# Patient Record
Sex: FEMALE | Race: WHITE | NOT HISPANIC OR LATINO | Employment: OTHER | URBAN - NONMETROPOLITAN AREA
[De-identification: names, ages, dates, MRNs, and addresses within clinical notes are randomized per-mention and may not be internally consistent; named-entity substitution may affect disease eponyms.]

---

## 2018-09-25 ENCOUNTER — OFFICE VISIT (OUTPATIENT)
Dept: CARDIOLOGY | Facility: CLINIC | Age: 83
End: 2018-09-25

## 2018-09-25 VITALS
WEIGHT: 133.2 LBS | HEART RATE: 63 BPM | BODY MASS INDEX: 24.51 KG/M2 | DIASTOLIC BLOOD PRESSURE: 79 MMHG | HEIGHT: 62 IN | OXYGEN SATURATION: 100 % | SYSTOLIC BLOOD PRESSURE: 152 MMHG

## 2018-09-25 DIAGNOSIS — R22.1 PULSATILE NECK MASS: ICD-10-CM

## 2018-09-25 DIAGNOSIS — R00.2 PALPITATIONS: ICD-10-CM

## 2018-09-25 DIAGNOSIS — M54.2 NECK PAIN ON RIGHT SIDE: ICD-10-CM

## 2018-09-25 DIAGNOSIS — R09.89 BRUIT OF RIGHT CAROTID ARTERY: ICD-10-CM

## 2018-09-25 DIAGNOSIS — R07.9 CHEST PAIN, UNSPECIFIED TYPE: Primary | ICD-10-CM

## 2018-09-25 DIAGNOSIS — I10 ESSENTIAL HYPERTENSION: ICD-10-CM

## 2018-09-25 DIAGNOSIS — R06.02 SHORTNESS OF BREATH: ICD-10-CM

## 2018-09-25 PROCEDURE — 99213 OFFICE O/P EST LOW 20 MIN: CPT | Performed by: NURSE PRACTITIONER

## 2018-09-25 PROCEDURE — 93000 ELECTROCARDIOGRAM COMPLETE: CPT | Performed by: NURSE PRACTITIONER

## 2018-09-25 RX ORDER — LISINOPRIL 5 MG/1
5 TABLET ORAL DAILY
COMMUNITY
Start: 2018-09-24 | End: 2018-12-04

## 2018-09-25 RX ORDER — NITROGLYCERIN 0.4 MG/1
0.4 TABLET SUBLINGUAL
COMMUNITY
Start: 2018-09-24 | End: 2022-08-18 | Stop reason: SDUPTHER

## 2018-09-25 NOTE — PROGRESS NOTES
Subjective   Radha Jung is a 82 y.o. female     Chief Complaint   Patient presents with   • Establish Care     Presents to Establish Care.    • Chest Pain   • Abnormal ECG   • Shortness of Breath       HPI    PROBLEM LIST:     1. Extrasystoles   1.1 Holter monitor 12/14/16-PVCs, PACs, 7 short runs of SVT  2. Shortness of breath   3. Fatigue   4. Myalgia      Patient is an 82-year-old female who presents today for a follow-up with her  at her side.  She states that she has been having left anterior under her breasts what feels like a dull throb.  She says it usually occurs at night.  She says and then she'll start to her all over.  She denies any nausea, lightheadedness or diaphoresis.  Will get a little short of breath.  She says this has occurred since her having left breast cancer and a lumpectomy.  She says that she feels a pulsation in her right neck and that is very sore.  When I palpate this area there is a movable mass that is there and it feels like it is her carotid artery with a possible aneurysm.  She denies any dizziness, presyncope, syncope, orthopnea, PND or edema.  She says she gets short of breath with more than normal activity.  She also complains of some numbness and tingling in her right hand and can only feel her right pinky and ring finger.  She does have a history of neck pain.    Current Outpatient Prescriptions   Medication Sig Dispense Refill   • GuaiFENesin (MUCUS RELIEF ADULT PO) Take 1 tablet by mouth Daily.     • lisinopril (PRINIVIL,ZESTRIL) 5 MG tablet Take 5 mg by mouth Daily. *Has not started yet*     • nitroglycerin (NITROSTAT) 0.4 MG SL tablet Place 0.4 mg under the tongue Every 5 (Five) Minutes As Needed for Chest Pain.     • aspirin 81 MG tablet Take 1 tablet by mouth Daily. 30 tablet 11     No current facility-administered medications for this visit.        ALLERGIES    Patient has no known allergies.    Past Medical History:   Diagnosis Date   • Asthma    • Colon  cancer (CMS/HCC)    • Hyperlipidemia        Social History     Social History   • Marital status:      Spouse name: N/A   • Number of children: N/A   • Years of education: N/A     Occupational History   • Not on file.     Social History Main Topics   • Smoking status: Former Smoker     Types: Cigarettes   • Smokeless tobacco: Never Used   • Alcohol use Yes      Comment: Social   • Drug use: No   • Sexual activity: Defer     Other Topics Concern   • Not on file     Social History Narrative   • No narrative on file       Family History   Problem Relation Age of Onset   • No Known Problems Mother    • Asthma Father        Review of Systems   Constitutional: Negative for chills, diaphoresis, fatigue and fever.   HENT: Positive for rhinorrhea. Negative for congestion, sneezing and sore throat.    Eyes: Positive for visual disturbance (Wears reading glasses).   Respiratory: Positive for shortness of breath (with activity; with more than normal activity). Negative for chest tightness.    Cardiovascular: Positive for chest pain (Patient is unsure of onset, however she states it is more noticeable at night. pain is located under left breast.  ) and palpitations (pulsation in her right neck  ). Negative for leg swelling.   Gastrointestinal: Positive for diarrhea. Negative for abdominal pain, blood in stool, constipation, nausea and vomiting.   Endocrine: Negative for cold intolerance and heat intolerance.   Genitourinary: Negative for difficulty urinating and hematuria.   Musculoskeletal: Positive for arthralgias, back pain, myalgias (To BLE at night) and neck pain (pain).   Skin: Negative.    Allergic/Immunologic: Positive for environmental allergies (Seasonal).   Neurological: Positive for numbness (right arm numb, can only feel pinky and ring finger ). Negative for dizziness, syncope, weakness, light-headedness and headaches.   Hematological: Bruises/bleeds easily (Bruise).   Psychiatric/Behavioral: Positive for  "sleep disturbance (Reports waking at night, however unsure what wakes her).       Objective   /79 (BP Location: Left arm, Patient Position: Sitting)   Pulse 63   Ht 157.5 cm (62\")   Wt 60.4 kg (133 lb 3.2 oz)   SpO2 100%   BMI 24.36 kg/m²   Vitals:    09/25/18 1312   BP: 152/79   BP Location: Left arm   Patient Position: Sitting   Pulse: 63   SpO2: 100%   Weight: 60.4 kg (133 lb 3.2 oz)   Height: 157.5 cm (62\")      Lab Results (most recent)     None        Physical Exam   Constitutional: She is oriented to person, place, and time. Vital signs are normal. She appears well-developed and well-nourished. She is active and cooperative.   HENT:   Head: Normocephalic.   Eyes: Lids are normal.   Neck: Normal carotid pulses, no hepatojugular reflux and no JVD present. Carotid bruit is present (soft, right ).   Right neck pulsatile mass, possible carotid aneurysm    Cardiovascular: Normal rate, regular rhythm and normal heart sounds.    Pulses:       Radial pulses are 2+ on the right side, and 2+ on the left side.        Dorsalis pedis pulses are 2+ on the right side, and 2+ on the left side.        Posterior tibial pulses are 2+ on the right side, and 2+ on the left side.   No edema BLE.   Pulmonary/Chest: Effort normal and breath sounds normal.   Abdominal: Normal appearance and bowel sounds are normal.   Neurological: She is alert and oriented to person, place, and time.   Skin: Skin is warm, dry and intact.   Psychiatric: She has a normal mood and affect. Her speech is normal and behavior is normal. Judgment and thought content normal. Cognition and memory are normal.       Procedure     ECG 12 Lead  Date/Time: 9/25/2018 5:40 PM  Performed by: ANNALISA TAVERA  Authorized by: ANNALISA TAVERA   Comparison: compared with previous ECG from 8/26/2016  Rhythm: sinus rhythm  Rhythm comments: sinus arrhythmia   Rate: normal  BPM: 72  T wave flattening noted on lead: nonspecific changes   QRS axis: normal  Clinical " impression: non-specific ECG                 Assessment/Plan      Diagnosis Plan   1. Chest pain, unspecified type  Stress Test With Myocardial Perfusion One Day    Adult Transthoracic Echo Complete W/ Cont if Necessary Per Protocol    aspirin 81 MG tablet   2. Palpitations  Stress Test With Myocardial Perfusion One Day    Adult Transthoracic Echo Complete W/ Cont if Necessary Per Protocol   3. Shortness of breath  Stress Test With Myocardial Perfusion One Day    Adult Transthoracic Echo Complete W/ Cont if Necessary Per Protocol   4. Essential hypertension  Stress Test With Myocardial Perfusion One Day    Adult Transthoracic Echo Complete W/ Cont if Necessary Per Protocol   5. Neck pain on right side  US Carotid Bilateral   6. Pulsatile neck mass  US Carotid Bilateral   7. Bruit of right carotid artery  US Carotid Bilateral       Return in about 8 weeks (around 11/20/2018).    Chest pain/palpitations/shortness of breath/hypertension-patient will have ischemia workup, stress and echo.  She will start aspirin 81 mg.  She will use nitroglycerin when necessary for chest pain no resolution she will go to the ER.  Right neck pain, pulsatile neck mass, right carotid bruit-patient have a lateral carotid artery ultrasound.  She will continue her medication regimen.  She'll follow-up in 8 weeks or sooner if any changes.       Patient's Body mass index is 24.36 kg/m². BMI is within normal parameters. No follow-up required.      Electronically signed by:

## 2018-09-25 NOTE — PATIENT INSTRUCTIONS
Nonspecific Chest Pain  Chest pain can be caused by many different conditions. There is a chance that your pain could be related to something serious, such as a heart attack or a blood clot in your lungs. Chest pain can also be caused by conditions that are not life-threatening. If you have chest pain, it is very important to follow up with your doctor.  Follow these instructions at home:  Medicines  · If you were prescribed an antibiotic medicine, take it as told by your doctor. Do not stop taking the antibiotic even if you start to feel better.  · Take over-the-counter and prescription medicines only as told by your doctor.  Lifestyle  · Do not use any products that contain nicotine or tobacco, such as cigarettes and e-cigarettes. If you need help quitting, ask your doctor.  · Do not drink alcohol.  · Make lifestyle changes as told by your doctor. These may include:  ? Getting regular exercise. Ask your doctor for some activities that are safe for you.  ? Eating a heart-healthy diet. A diet specialist (dietitian) can help you to learn healthy eating options.  ? Staying at a healthy weight.  ? Managing diabetes, if needed.  ? Lowering your stress, as with deep breathing or spending time in nature.  General instructions  · Avoid any activities that make you feel chest pain.  · If your chest pain is because of heartburn:  ? Raise (elevate) the head of your bed about 6 inches (15 cm). You can do this by putting blocks under the bed legs at the head of the bed.  ? Do not sleep with extra pillows under your head. That does not help heartburn.  · Keep all follow-up visits as told by your doctor. This is important. This includes any further testing if your chest pain does not go away.  Contact a doctor if:  · Your chest pain does not go away.  · You have a rash with blisters on your chest.  · You have a fever.  · You have chills.  Get help right away if:  · Your chest pain is worse.  · You have a cough that gets worse, or  you cough up blood.  · You have very bad (severe) pain in your belly (abdomen).  · You are very weak.  · You pass out (faint).  · You have either of these for no clear reason:  ? Sudden chest discomfort.  ? Sudden discomfort in your arms, back, neck, or jaw.  · You have shortness of breath at any time.  · You suddenly start to sweat, or your skin gets clammy.  · You feel sick to your stomach (nauseous).  · You throw up (vomit).  · You suddenly feel light-headed or dizzy.  · Your heart starts to beat fast, or it feels like it is skipping beats.  These symptoms may be an emergency. Do not wait to see if the symptoms will go away. Get medical help right away. Call your local emergency services (911 in the [place] Do not drive yourself to the hospital.  This information is not intended to replace advice given to you by your health care provider. Make sure you discuss any questions you have with your health care provider.  Document Released: 06/05/2009 Document Revised: 09/11/2017 Document Reviewed: 09/11/2017  [employer] Interactive Patient Education © 2017 [employer]

## 2018-10-31 ENCOUNTER — HOSPITAL ENCOUNTER (OUTPATIENT)
Dept: CARDIOLOGY | Facility: HOSPITAL | Age: 83
Discharge: HOME | End: 2018-10-31

## 2018-10-31 DIAGNOSIS — M54.2 NECK PAIN ON RIGHT SIDE: ICD-10-CM

## 2018-10-31 DIAGNOSIS — R22.1 PULSATILE NECK MASS: ICD-10-CM

## 2018-10-31 DIAGNOSIS — R09.89 BRUIT OF RIGHT CAROTID ARTERY: ICD-10-CM

## 2018-10-31 PROCEDURE — 25010000002 REGADENOSON 0.4 MG/5ML SOLUTION: Performed by: INTERNAL MEDICINE

## 2018-10-31 PROCEDURE — 93306 TTE W/DOPPLER COMPLETE: CPT | Performed by: INTERNAL MEDICINE

## 2018-10-31 PROCEDURE — A9500 TC99M SESTAMIBI: HCPCS | Performed by: INTERNAL MEDICINE

## 2018-10-31 PROCEDURE — 0 TECHNETIUM SESTAMIBI: Performed by: INTERNAL MEDICINE

## 2018-10-31 PROCEDURE — 93017 CV STRESS TEST TRACING ONLY: CPT

## 2018-10-31 PROCEDURE — 78452 HT MUSCLE IMAGE SPECT MULT: CPT

## 2018-10-31 PROCEDURE — 93880 EXTRACRANIAL BILAT STUDY: CPT | Performed by: INTERNAL MEDICINE

## 2018-10-31 PROCEDURE — 78452 HT MUSCLE IMAGE SPECT MULT: CPT | Performed by: INTERNAL MEDICINE

## 2018-10-31 PROCEDURE — 93306 TTE W/DOPPLER COMPLETE: CPT

## 2018-10-31 PROCEDURE — 93880 EXTRACRANIAL BILAT STUDY: CPT

## 2018-10-31 PROCEDURE — 93018 CV STRESS TEST I&R ONLY: CPT | Performed by: INTERNAL MEDICINE

## 2018-10-31 RX ADMIN — TECHNETIUM TC 99M SESTAMIBI 1 DOSE: 1 INJECTION INTRAVENOUS at 09:10

## 2018-10-31 RX ADMIN — REGADENOSON 0.4 MG: 0.08 INJECTION, SOLUTION INTRAVENOUS at 08:30

## 2018-10-31 RX ADMIN — TECHNETIUM TC 99M SESTAMIBI 1 DOSE: 1 INJECTION INTRAVENOUS at 08:30

## 2018-11-04 LAB
BH CV ECHO MEAS - BSA(HAYCOCK): 1.6 M^2
BH CV ECHO MEAS - BSA: 1.6 M^2
BH CV ECHO MEAS - BZI_BMI: 24.3 KILOGRAMS/M^2
BH CV ECHO MEAS - BZI_METRIC_HEIGHT: 157.5 CM
BH CV ECHO MEAS - BZI_METRIC_WEIGHT: 60.3 KG
BH CV XLRA MEAS LEFT BULB EDV: -13.8 CM/SEC
BH CV XLRA MEAS LEFT BULB PSV: -81.6 CM/SEC
BH CV XLRA MEAS LEFT CCA RATIO VEL: -86.8 CM/SEC
BH CV XLRA MEAS LEFT DIST CCA EDV: -19.8 CM/SEC
BH CV XLRA MEAS LEFT DIST CCA PSV: -86.8 CM/SEC
BH CV XLRA MEAS LEFT DIST ICA EDV: -27.5 CM/SEC
BH CV XLRA MEAS LEFT DIST ICA PSV: -82.5 CM/SEC
BH CV XLRA MEAS LEFT ICA RATIO VEL: -97.1 CM/SEC
BH CV XLRA MEAS LEFT ICA/CCA RATIO: 1.1
BH CV XLRA MEAS LEFT MID ICA EDV: -49.8 CM/SEC
BH CV XLRA MEAS LEFT MID ICA PSV: -87.7 CM/SEC
BH CV XLRA MEAS LEFT PROX CCA EDV: 23.2 CM/SEC
BH CV XLRA MEAS LEFT PROX CCA PSV: 107 CM/SEC
BH CV XLRA MEAS LEFT PROX ECA EDV: -8.6 CM/SEC
BH CV XLRA MEAS LEFT PROX ECA PSV: -74.8 CM/SEC
BH CV XLRA MEAS LEFT PROX ICA EDV: -37 CM/SEC
BH CV XLRA MEAS LEFT PROX ICA PSV: -97.1 CM/SEC
BH CV XLRA MEAS LEFT VERTEBRAL A EDV: -14.6 CM/SEC
BH CV XLRA MEAS LEFT VERTEBRAL A PSV: -54.1 CM/SEC
BH CV XLRA MEAS RIGHT BULB EDV: -9.5 CM/SEC
BH CV XLRA MEAS RIGHT BULB PSV: -77.3 CM/SEC
BH CV XLRA MEAS RIGHT CCA RATIO VEL: 107 CM/SEC
BH CV XLRA MEAS RIGHT DIST CCA EDV: 21.5 CM/SEC
BH CV XLRA MEAS RIGHT DIST CCA PSV: 107 CM/SEC
BH CV XLRA MEAS RIGHT DIST ICA EDV: -26.6 CM/SEC
BH CV XLRA MEAS RIGHT DIST ICA PSV: -98.8 CM/SEC
BH CV XLRA MEAS RIGHT ICA RATIO VEL: -103 CM/SEC
BH CV XLRA MEAS RIGHT ICA/CCA RATIO: -0.96
BH CV XLRA MEAS RIGHT MID ICA EDV: -28.4 CM/SEC
BH CV XLRA MEAS RIGHT MID ICA PSV: -103 CM/SEC
BH CV XLRA MEAS RIGHT PROX CCA EDV: 22.3 CM/SEC
BH CV XLRA MEAS RIGHT PROX CCA PSV: 134 CM/SEC
BH CV XLRA MEAS RIGHT PROX ECA EDV: -7.7 CM/SEC
BH CV XLRA MEAS RIGHT PROX ECA PSV: -59.3 CM/SEC
BH CV XLRA MEAS RIGHT PROX ICA EDV: 36.1 CM/SEC
BH CV XLRA MEAS RIGHT PROX ICA PSV: 80.8 CM/SEC
BH CV XLRA MEAS RIGHT VERTEBRAL A EDV: 0 CM/SEC
BH CV XLRA MEAS RIGHT VERTEBRAL A PSV: -62.7 CM/SEC

## 2018-11-05 ENCOUNTER — TELEPHONE (OUTPATIENT)
Dept: CARDIOLOGY | Facility: CLINIC | Age: 83
End: 2018-11-05

## 2018-11-05 DIAGNOSIS — Z00.00 HEALTHCARE MAINTENANCE: Primary | ICD-10-CM

## 2018-11-05 DIAGNOSIS — R06.02 SHORTNESS OF BREATH: ICD-10-CM

## 2018-11-05 DIAGNOSIS — R00.2 PALPITATIONS: ICD-10-CM

## 2018-11-05 DIAGNOSIS — E78.5 HYPERLIPIDEMIA, UNSPECIFIED HYPERLIPIDEMIA TYPE: ICD-10-CM

## 2018-11-05 DIAGNOSIS — I49.49 EXTRASYSTOLE: ICD-10-CM

## 2018-11-05 DIAGNOSIS — M79.10 MYALGIA: ICD-10-CM

## 2018-11-05 DIAGNOSIS — R06.02 SOB (SHORTNESS OF BREATH): ICD-10-CM

## 2018-11-05 DIAGNOSIS — I77.9 CAROTID ARTERY DISEASE, UNSPECIFIED LATERALITY, UNSPECIFIED TYPE (HCC): ICD-10-CM

## 2018-11-05 RX ORDER — ATORVASTATIN CALCIUM 20 MG/1
20 TABLET, FILM COATED ORAL DAILY
Qty: 30 TABLET | Refills: 6 | Status: SHIPPED | OUTPATIENT
Start: 2018-11-05 | End: 2019-02-12

## 2018-11-05 NOTE — TELEPHONE ENCOUNTER
Informed pt of all of her results and the message from Melina. Pt is willing to try a statin and she has ASA already. Confirmed px and informed pt that I would send it in for her. Pt states that she was unaware that she had an appt, I gave her the date and time.  Explained that Melina will review everything in depth w/ pt at that appt, pt verbalized understanding.       (Put in lab orders and sent rx to px)

## 2018-11-05 NOTE — TELEPHONE ENCOUNTER
Per Echo:  Estimated EF appears to be in the range of 56 - 60%.  Some regurgitation in several valves.           Per Stress:  #1.  Diffuse uptake of the radiopharmaceutical in the lung of unknown significance.     #2.  No scintigraphic evidence of ischemia.     #3.  Preserved post stress left ventricular systolic function with an ejection fraction of 61% and no focal wall motion abnormalities.        Per US:   #1.  Both common carotid arteries are without obstruction.     #2.  The bulb and bifurcation are also widely patent bilaterally with no flow-limiting stenoses by echo Doppler sampling.     #3.  There is 16-49% stenosis in the mid right internal carotid artery, and less than or equal to 15% stenosis throughout the left internal carotid artery.     #4.  Antegrade flow was demonstrated both vertebral arteries.     Summary: Nonobstructive carotid disease bilaterally as detailed above.  Antegrade flow both vertebral arteries.

## 2018-11-05 NOTE — TELEPHONE ENCOUNTER
"----- Message from NOE Lala sent at 11/5/2018  7:45 AM EST -----  (Stress and echo) Please advise patient have her keep follow=up appt.          (US) NO further work-up at this time, however, needs ASA and statin.  Is she will to take statin?\"  If so atorvastatin 20 mg PO QHS and get lipid and CMP in 6 weeks.   "

## 2018-11-27 ENCOUNTER — OFFICE VISIT (OUTPATIENT)
Dept: CARDIOLOGY | Facility: CLINIC | Age: 83
End: 2018-11-27

## 2018-11-27 VITALS
HEIGHT: 62 IN | SYSTOLIC BLOOD PRESSURE: 164 MMHG | HEART RATE: 77 BPM | WEIGHT: 136.4 LBS | OXYGEN SATURATION: 95 % | DIASTOLIC BLOOD PRESSURE: 81 MMHG | BODY MASS INDEX: 25.1 KG/M2

## 2018-11-27 DIAGNOSIS — I65.23 BILATERAL CAROTID ARTERY STENOSIS: ICD-10-CM

## 2018-11-27 DIAGNOSIS — I51.89 DIASTOLIC DYSFUNCTION: ICD-10-CM

## 2018-11-27 DIAGNOSIS — R07.89 OTHER CHEST PAIN: ICD-10-CM

## 2018-11-27 DIAGNOSIS — R06.02 SHORTNESS OF BREATH: ICD-10-CM

## 2018-11-27 DIAGNOSIS — I49.49 EXTRASYSTOLE: ICD-10-CM

## 2018-11-27 DIAGNOSIS — R00.2 PALPITATIONS: ICD-10-CM

## 2018-11-27 DIAGNOSIS — E78.5 HYPERLIPIDEMIA, UNSPECIFIED HYPERLIPIDEMIA TYPE: ICD-10-CM

## 2018-11-27 DIAGNOSIS — I10 ESSENTIAL HYPERTENSION: Primary | ICD-10-CM

## 2018-11-27 PROCEDURE — 99213 OFFICE O/P EST LOW 20 MIN: CPT | Performed by: NURSE PRACTITIONER

## 2018-11-27 RX ORDER — FUROSEMIDE 20 MG/1
20 TABLET ORAL DAILY PRN
Qty: 30 TABLET | Refills: 11 | Status: SHIPPED | OUTPATIENT
Start: 2018-11-27

## 2018-11-27 NOTE — PATIENT INSTRUCTIONS

## 2018-11-27 NOTE — PROGRESS NOTES
"Subjective   Radha Jung is a 83 y.o. female     Chief Complaint   Patient presents with   • Follow-up     Pt in office to follow up on echo, stress, and carotid        HPI    PROBLEM LIST:     1. Extrasystoles   1.1 Holter monitor 12/14/16-PVCs, PACs, 7 short runs of SVT  2. Shortness of breath   2.1 Echo 10/31/18-EF 56-60%, diastolic dysfunction 2, moderate LVH, trace AR, mild to moderate MR, mild TR, mild SC  3. Fatigue   4. Myalgia   5.  Stress test 10/31/18-no evidence of ischemia, preserved posterior LVEF of 61%  6.  Carotid artery disease  6.1 carotid artery ultrasound 10/31/18-both common carotid arteries are without obstruction, bulb and bifurcation are widely patent, 16-49% stenosis in the mid right internal carotid artery, less than or equal to 15% stenosis throughout the left internal carotid artery, antegrade flow demonstrated both vertebral arteries    Patient is an 83-year-old female who presents today for follow-up on testing with her  at her side.  She says that she does have occasional left-sided chest pain that is dull and proper.  She says she is not sure if it is her \"boob\" due to the fact that that is the side that she had a lumpectomy due to breast cancer.  She has no other symptoms when this occurs.  She denies any palpitations, fluttering, dizziness, presyncope, syncope, orthopnea, PND or edema.  She states that she gets short of breath sometimes when she's not doing anything and sometimes when she is active and sometime she is not short of breath at all.  Patient thinks that her statin is causing her to have a headache.  I'm thinking it more likely due to her blood pressure however nonetheless we will have her hold her statin for 2 weeks to see if this improves her symptoms.    We went over stress, echo and carotid.    Current Outpatient Medications   Medication Sig Dispense Refill   • aspirin 81 MG tablet Take 1 tablet by mouth Daily. 30 tablet 11   • atorvastatin (LIPITOR) 20 MG " tablet Take 1 tablet by mouth Daily. 30 tablet 6   • GuaiFENesin (MUCUS RELIEF ADULT PO) Take 1 tablet by mouth Daily.     • nitroglycerin (NITROSTAT) 0.4 MG SL tablet Place 0.4 mg under the tongue Every 5 (Five) Minutes As Needed for Chest Pain.     • furosemide (LASIX) 20 MG tablet Take 1 tablet by mouth Daily As Needed (For 2 lb weight gain overnight and/or swelling in legs). 30 tablet 11   • lisinopril (PRINIVIL,ZESTRIL) 5 MG tablet Take 5 mg by mouth Daily. *Has not started yet*       No current facility-administered medications for this visit.        ALLERGIES    Patient has no known allergies.    Past Medical History:   Diagnosis Date   • Asthma    • Colon cancer (CMS/[facility])    • Hyperlipidemia        Social History     Socioeconomic History   • Marital status:      Spouse name: Not on file   • Number of children: Not on file   • Years of education: Not on file   • Highest education level: Not on file   Social Needs   • Financial resource strain: Not on file   • Food insecurity - worry: Not on file   • Food insecurity - inability: Not on file   • Transportation needs - medical: Not on file   • Transportation needs - non-medical: Not on file   Occupational History   • Not on file   Tobacco Use   • Smoking status: Former Smoker     Types: Cigarettes   • Smokeless tobacco: Never Used   Substance and Sexual Activity   • Alcohol use: Yes     Comment: Social   • Drug use: No   • Sexual activity: Defer   Other Topics Concern   • Not on file   Social History Narrative   • Not on file       Family History   Problem Relation Age of Onset   • No Known Problems Mother    • Asthma Father        Review of Systems   Constitutional: Positive for fatigue. Negative for diaphoresis.   HENT: Positive for rhinorrhea (States she always has a runny nose). Negative for congestion and sore throat.    Eyes: Positive for visual disturbance (reading glasses).   Respiratory: Positive for chest tightness (occasionally ) and shortness  "of breath (States she gets SoA when she isn't doing anything, not all of the time ).    Cardiovascular: Positive for chest pain (occasionally. Left-sided CP, dull pain; it is a throb, not sure if it is her heart or boob, due to h/o cancer surgery there ). Negative for palpitations and leg swelling.   Gastrointestinal: Positive for nausea (Pt had N/V the day before yesterday, but is better now. ). Negative for abdominal pain, blood in stool, constipation, diarrhea and vomiting.   Endocrine: Negative for cold intolerance and heat intolerance.   Genitourinary: Positive for frequency. Negative for difficulty urinating, dysuria, hematuria and urgency.   Musculoskeletal: Positive for arthralgias, back pain and neck pain.   Skin: Negative.  Negative for rash and wound.   Allergic/Immunologic: Positive for environmental allergies. Negative for food allergies.   Neurological: Positive for weakness (generalized ), numbness (hands) and headaches (Pt c/o daily headaches; since starting lipitor; across forehead, like top of head going to come off, does not last long, comes and goes ). Negative for dizziness, syncope and light-headedness.   Hematological: Bruises/bleeds easily.   Psychiatric/Behavioral: Positive for sleep disturbance (Pt states that she can't go to sleep or stay asleep).       Objective   /81   Pulse 77   Ht 157.5 cm (62\")   Wt 61.9 kg (136 lb 6.4 oz)   SpO2 95%   BMI 24.95 kg/m²   Vitals:    11/27/18 1300   BP: 164/81   Pulse: 77   SpO2: 95%   Weight: 61.9 kg (136 lb 6.4 oz)   Height: 157.5 cm (62\")      Lab Results (most recent)     None        Physical Exam   Constitutional: She is oriented to person, place, and time. Vital signs are normal. She appears well-developed and well-nourished. She is active and cooperative.   HENT:   Head: Normocephalic.   Eyes: Lids are normal.   Neck: Normal carotid pulses, no hepatojugular reflux and no JVD present. Carotid bruit is present (soft right ). "   Cardiovascular: Normal rate, regular rhythm and normal heart sounds.   Pulses:       Radial pulses are 2+ on the right side, and 2+ on the left side.        Dorsalis pedis pulses are 2+ on the right side, and 2+ on the left side.        Posterior tibial pulses are 2+ on the right side, and 2+ on the left side.   No edema BLE.   Pulmonary/Chest: Effort normal and breath sounds normal.   Abdominal: Normal appearance and bowel sounds are normal.   Neurological: She is alert and oriented to person, place, and time.   Skin: Skin is warm, dry and intact.   Psychiatric: She has a normal mood and affect. Her speech is normal and behavior is normal. Judgment and thought content normal. Cognition and memory are normal.       Procedure   Procedures         Assessment/Plan      Diagnosis Plan   1. Essential hypertension     2. Palpitations     3. Extrasystole     4. Shortness of breath     5. Hyperlipidemia, unspecified hyperlipidemia type     6. Diastolic dysfunction  furosemide (LASIX) 20 MG tablet   7. Other chest pain     8. Bilateral carotid artery stenosis         Return in about 9 weeks (around 1/29/2019).    Shortness of breath-stable.  Palpitations-resolved.  Hyperlipidemia-patient was on Lipitor but she will hold that for 2 weeks to see if this resolves her headache.  If not she will go back on it.  If it does she will call will try a different medication.  Diastolic dysfunction-patient will use Lasix as needed for 2 pound weight gain overnight and or leg swelling.  Carotid artery disease-patient is on aspirin and again would hold her Lipitor for 2 weeks to see if this improves her headaches.  Retention-patient says her blood pressure is normally very good.  She will monitor her blood pressure when she calls me in 2 weeks if it is still elevated then we will increase her lisinopril.  Continue her medication regimen otherwise.  She will follow-up in 9 weeks or sooner if any changes.  Chest pain-atypical with normal  stress test.         Patient's Body mass index is 24.95 kg/m². BMI is within normal parameters. No follow-up required.      Electronically signed by:

## 2018-12-04 ENCOUNTER — TELEPHONE (OUTPATIENT)
Dept: CARDIOLOGY | Facility: CLINIC | Age: 83
End: 2018-12-04

## 2018-12-04 RX ORDER — AMLODIPINE BESYLATE 5 MG/1
5 TABLET ORAL DAILY
Qty: 30 TABLET | Refills: 11 | Status: SHIPPED | OUTPATIENT
Start: 2018-12-04 | End: 2019-02-12

## 2018-12-04 RX ORDER — CLONIDINE HYDROCHLORIDE 0.1 MG/1
TABLET ORAL
Qty: 30 TABLET | Refills: 5 | Status: SHIPPED | OUTPATIENT
Start: 2018-12-04 | End: 2022-08-17 | Stop reason: SDDI

## 2018-12-04 NOTE — TELEPHONE ENCOUNTER
Per Melina, find out how pt's HR is and if she has any other sx?   We can send in 0.1 Clonidine. Talk w/ Melina before ending call.     States that they stopped the BP medication for 2 weeks to see if HA improved. States pt's Ha improved, but BP worsened. Unsure of HR, as they have been getting BP at [facility].     Placed them on hold to speak w/ Melina. Per Melina, send in Amlodipine 5mg daily, could take 3 days to take effect. Send in Clonidine TID PRN. Add Lisinopril as allergy. Have pt call and report back in 1 week.     Informed pt and her  of the message from Melina, they verbalized understanding.

## 2018-12-04 NOTE — TELEPHONE ENCOUNTER
----- Message from Lexus Saunders MA sent at 12/4/2018 10:44 AM EST -----  Patient's  Jc called stating that he patient has been having some blood pressure issues. On 12/2 bp was 193/112, 12/3 bp was 169/105, and 12/4 bp was 205/109. He also stating the patient had stopped lipitor 1 week ago. They would like a call back at [phone], her cell is [phone] and his cell [phone]

## 2019-02-12 ENCOUNTER — OFFICE VISIT (OUTPATIENT)
Dept: CARDIOLOGY | Facility: CLINIC | Age: 84
End: 2019-02-12

## 2019-02-12 VITALS
HEIGHT: 62 IN | BODY MASS INDEX: 25.32 KG/M2 | WEIGHT: 137.6 LBS | HEART RATE: 84 BPM | OXYGEN SATURATION: 96 % | DIASTOLIC BLOOD PRESSURE: 69 MMHG | SYSTOLIC BLOOD PRESSURE: 125 MMHG

## 2019-02-12 DIAGNOSIS — R07.9 CHEST PAIN, UNSPECIFIED TYPE: ICD-10-CM

## 2019-02-12 DIAGNOSIS — R00.2 PALPITATIONS: ICD-10-CM

## 2019-02-12 DIAGNOSIS — E78.5 HYPERLIPIDEMIA, UNSPECIFIED HYPERLIPIDEMIA TYPE: ICD-10-CM

## 2019-02-12 DIAGNOSIS — R06.02 SHORTNESS OF BREATH: ICD-10-CM

## 2019-02-12 DIAGNOSIS — J06.9 UPPER RESPIRATORY TRACT INFECTION, UNSPECIFIED TYPE: ICD-10-CM

## 2019-02-12 DIAGNOSIS — I65.21 STENOSIS OF RIGHT CAROTID ARTERY: Primary | ICD-10-CM

## 2019-02-12 PROCEDURE — 99213 OFFICE O/P EST LOW 20 MIN: CPT | Performed by: NURSE PRACTITIONER

## 2019-02-12 RX ORDER — GUAIFENESIN 600 MG/1
1200 TABLET, EXTENDED RELEASE ORAL 2 TIMES DAILY
COMMUNITY
End: 2022-08-17 | Stop reason: ALTCHOICE

## 2019-02-12 RX ORDER — AZITHROMYCIN 250 MG/1
TABLET, FILM COATED ORAL
Qty: 6 TABLET | Refills: 0 | Status: SHIPPED | OUTPATIENT
Start: 2019-02-12 | End: 2022-08-17 | Stop reason: ALTCHOICE
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
## 2019-02-12 NOTE — PATIENT INSTRUCTIONS
"Fat and Cholesterol Restricted Diet  Getting too much fat and cholesterol in your diet may cause health problems. Following this diet helps keep your fat and cholesterol at normal levels. This can keep you from getting sick.  What types of fat should I choose?  · Choose monosaturated and polyunsaturated fats. These are found in foods such as olive oil, canola oil, flaxseeds, walnuts, almonds, and seeds.  · Eat more omega-3 fats. Good choices include salmon, mackerel, sardines, tuna, flaxseed oil, and ground flaxseeds.  · Limit saturated fats. These are in animal products such as meats, butter, and cream. They can also be in plant products such as palm oil, palm kernel oil, and coconut oil.  · Avoid foods with partially hydrogenated oils in them. These contain trans fats. Examples of foods that have trans fats are stick margarine, some tub margarines, cookies, crackers, and other baked goods.  What general guidelines do I need to follow?  · Check food labels. Look for the words \"trans fat\" and \"saturated fat.\"  · When preparing a meal:  ? Fill half of your plate with vegetables and green salads.  ? Fill one fourth of your plate with whole grains. Look for the word \"whole\" as the first word in the ingredient list.  ? Fill one fourth of your plate with lean protein foods.  · Eat more foods that have fiber, like apples, carrots, beans, peas, and barley.  · Eat more home-cooked foods. Eat less at restaurants and buffets.  · Limit or avoid alcohol.  · Limit foods high in starch and sugar.  · Limit fried foods.  · Cook foods without frying them. Baking, boiling, grilling, and broiling are all great options.  · Lose weight if you are overweight. Losing even a small amount of weight can help your overall health. It can also help prevent diseases such as diabetes and heart disease.  What foods can I eat?  Grains  Whole grains, such as whole wheat or whole grain breads, crackers, cereals, and pasta. Unsweetened oatmeal, " bulgur, barley, quinoa, or brown rice. Corn or whole wheat flour tortillas.  Vegetables  Fresh or frozen vegetables (raw, steamed, roasted, or grilled). Green salads.  Fruits  All fresh, canned (in natural juice), or frozen fruits.  Meat and Other Protein Products  Ground beef (85% or leaner), grass-fed beef, or beef trimmed of fat. Skinless chicken or turkey. Ground chicken or turkey. Pork trimmed of fat. All fish and seafood. Eggs. Dried beans, peas, or lentils. Unsalted nuts or seeds. Unsalted canned or dry beans.  Dairy  Low-fat dairy products, such as skim or 1% milk, 2% or reduced-fat cheeses, low-fat ricotta or cottage cheese, or plain low-fat yogurt.  Fats and Oils  Tub margarines without trans fats. Light or reduced-fat mayonnaise and salad dressings. Avocado. Olive, canola, sesame, or safflower oils. Natural peanut or almond butter (choose ones without added sugar and oil).  The items listed above may not be a complete list of recommended foods or beverages. Contact your dietitian for more options.  What foods are not recommended?  Grains  White bread. White pasta. White rice. Cornbread. Bagels, pastries, and croissants. Crackers that contain trans fat.  Vegetables  White potatoes. Corn. Creamed or fried vegetables. Vegetables in a cheese sauce.  Fruits  Dried fruits. Canned fruit in light or heavy syrup. Fruit juice.  Meat and Other Protein Products  Fatty cuts of meat. Ribs, chicken wings, chen, sausage, bologna, salami, chitterlings, fatback, hot dogs, bratwurst, and packaged luncheon meats. Liver and organ meats.  Dairy  Whole or 2% milk, cream, half-and-half, and cream cheese. Whole milk cheeses. Whole-fat or sweetened yogurt. Full-fat cheeses. Nondairy creamers and whipped toppings. Processed cheese, cheese spreads, or cheese curds.  Sweets and Desserts  Corn syrup, sugars, honey, and molasses. Candy. Jam and jelly. Syrup. Sweetened cereals. Cookies, pies, cakes, donuts, muffins, and ice  cream.  Fats and Oils  Butter, stick margarine, lard, shortening, ghee, or chen fat. Coconut, palm kernel, or palm oils.  Beverages  Alcohol. Sweetened drinks (such as sodas, lemonade, and fruit drinks or punches).  The items listed above may not be a complete list of foods and beverages to avoid. Contact your dietitian for more information.  This information is not intended to replace advice given to you by your health care provider. Make sure you discuss any questions you have with your health care provider.  Document Released: 06/18/2013 Document Revised: 08/24/2017 Document Reviewed: 03/19/2015  [employer] Interactive Patient Education © 2018 [employer]  BMI for Adults  Body mass index (BMI) is a number that is calculated from a person's weight and height. In most adults, the number is used to find how much of an adult's weight is made up of fat. BMI is not as accurate as a direct measure of body fat.  How is BMI calculated?  BMI is calculated by dividing weight in kilograms by height in meters squared. It can also be calculated by dividing weight in pounds by height in inches squared, then multiplying the resulting number by 703. Charts are available to help you find your BMI quickly and easily without doing this calculation.  How is BMI interpreted?  Health care professionals use BMI charts to identify whether an adult is underweight, at a normal weight, or overweight based on the following guidelines:  · Underweight: BMI less than 18.5.  · Normal weight: BMI between 18.5 and 24.9.  · Overweight: BMI between 25 and 29.9.  · Obese: BMI of 30 and above.    BMI is usually interpreted the same for males and females.  Weight includes both fat and muscle, so someone with a muscular build, such as an athlete, may have a BMI that is higher than 24.9. In cases like these, BMI may not accurately depict body fat. To determine if excess body fat is the cause of a BMI of 25 or higher, further assessments may need to be  "done by a health care provider.  Why is BMI a useful tool?  BMI is used to identify a possible weight problem that may be related to a medical problem or may increase the risk for medical problems. BMI can also be used to promote changes to reach a healthy weight.  This information is not intended to replace advice given to you by your health care provider. Make sure you discuss any questions you have with your health care provider.  Document Released: 08/29/2005 Document Revised: 04/27/2017 Document Reviewed: 05/15/2015  [employer] Interactive Patient Education © 2018 [employer]  Carotid Artery Disease  The carotid arteries are the two main arteries on either side of the neck that supply blood to the brain. Carotid artery disease, also called carotid artery stenosis, is the narrowing or blockage of one or both carotid arteries. Carotid artery disease increases your risk for a stroke or a transient ischemic attack (TIA). A TIA is an episode in which a waxy, fatty substance that accumulates within the artery (plaque) blocks blood flow to the brain. A TIA is considered a \"warning stroke.\"  What are the causes?  · Buildup of plaque inside the carotid arteries (atherosclerosis) (common).  · A weakened outpouching in an artery (aneurysm).  · Inflammation of the carotid artery (arteritis).  · A fibrous growth within the carotid artery (fibromuscular dysplasia).  · Tissue death within the carotid artery due to radiation treatment (post-radiation necrosis).  · Decreased blood flow due to spasms of the carotid artery (vasospasm).  · Separation of the walls of the carotid artery (carotid dissection).  What increases the risk?  · High cholesterol (dyslipidemia).  · High blood pressure (hypertension).  · Smoking.  · Obesity.  · Diabetes.  · Family history of cardiovascular disease.  · Inactivity or lack of regular exercise.  · Being male. Men have an increased risk of developing atherosclerosis earlier in life than " women.  What are the signs or symptoms?  Carotid artery disease does not cause symptoms.  How is this diagnosed?  Diagnosis of carotid artery disease may include:  · A physical exam. Your health care provider may hear an abnormal sound (bruit) when listening to the carotid arteries.  · Specific tests that look at the blood flow in the carotid arteries. These tests include:  ? Carotid artery ultrasonography.  ? Carotid or cerebral angiography.  ? Computerized tomographic angiography (CTA).  ? Magnetic resonance angiography (MRA).    How is this treated?  Treatment of carotid artery disease can include a combination of treatments. Treatment options include:  · Surgery. You may have:  ? A carotid endarterectomy. This is a surgery to remove the blockages in the carotid arteries.  ? A carotid angioplasty with stenting. This is a nonsurgical interventional procedure. A wire mesh (stent) is used to widen the blocked carotid arteries.  · Medicines to control blood pressure, cholesterol, and reduce blood clotting (antiplatelet therapy).  · Adjusting your diet.  · Lifestyle changes such as:  ? Quitting smoking.  ? Exercising as tolerated or as directed by your health care provider.  ? Controlling and maintaining a good blood pressure.  ? Keeping cholesterol levels under control.    Follow these instructions at home:  · Take medicines only as directed by your health care provider. Make sure you understand all your medicine instructions. Do not stop your medicines without talking to your health care provider.  · Follow your health care provider's diet instructions. It is important to eat a healthy diet that is low in saturated fats and includes plenty of fresh fruits, vegetables, and lean meats. High-fat, high-sodium foods as well as foods that are fried, overly processed, or have poor nutritional value should be avoided.  · Maintain a healthy weight.  · Stay physically active. It is recommended that you get at least 30 minutes  of activity every day.  · Do not use any tobacco products including cigarettes, chewing tobacco, or electronic cigarettes. If you need help quitting, ask your health care provider.  · Limit alcohol use to:  ? No more than 2 drinks per day for men.  ? No more than 1 drink per day for nonpregnant women.  · Do not use illegal drugs.  · Keep all follow-up visits as directed by your health care provider.  Get help right away if:  You develop TIA or stroke symptoms. These include:  · Sudden weakness or numbness on one side of the body, such as in the face, arm, or leg.  · Sudden confusion.  · Trouble speaking (aphasia) or understanding.  · Sudden trouble seeing out of one or both eyes.  · Sudden trouble walking.  · Dizziness or feeling like you might faint.  · Loss of balance or coordination.  · Sudden severe headache with no known cause.  · Sudden trouble swallowing (dysphagia).    If you have any of these symptoms, call your local emergency services (911 in [place] Do not drive yourself to the clinic or hospital. This is a medical emergency.  This information is not intended to replace advice given to you by your health care provider. Make sure you discuss any questions you have with your health care provider.  Document Released: 03/11/2013 Document Revised: 05/25/2017 Document Reviewed: 06/18/2014  [employer] Interactive Patient Education © 2017 [employer]

## 2020-11-04 ENCOUNTER — OFFICE VISIT (OUTPATIENT)
Dept: CARDIOLOGY | Facility: CLINIC | Age: 85
End: 2020-11-04

## 2020-11-04 VITALS
HEIGHT: 62 IN | WEIGHT: 129 LBS | BODY MASS INDEX: 23.74 KG/M2 | SYSTOLIC BLOOD PRESSURE: 150 MMHG | DIASTOLIC BLOOD PRESSURE: 90 MMHG | HEART RATE: 72 BPM | OXYGEN SATURATION: 96 %

## 2020-11-04 DIAGNOSIS — G81.90 HEMIPARESIS, UNSPECIFIED HEMIPARESIS ETIOLOGY, UNSPECIFIED LATERALITY (HCC): ICD-10-CM

## 2020-11-04 DIAGNOSIS — G45.9 TIA (TRANSIENT ISCHEMIC ATTACK): Primary | ICD-10-CM

## 2020-11-04 DIAGNOSIS — R06.02 SHORTNESS OF BREATH: ICD-10-CM

## 2020-11-04 DIAGNOSIS — H04.032 LACRIMAL GLAND ENLARGEMENT, CHRONIC, LEFT: ICD-10-CM

## 2020-11-04 DIAGNOSIS — R93.0 ABNORMAL CT OF THE HEAD: ICD-10-CM

## 2020-11-04 DIAGNOSIS — R09.89 CAROTID BRUIT, UNSPECIFIED LATERALITY: ICD-10-CM

## 2020-11-04 DIAGNOSIS — I10 ESSENTIAL HYPERTENSION: ICD-10-CM

## 2020-11-04 DIAGNOSIS — R00.2 PALPITATIONS: ICD-10-CM

## 2020-11-04 PROCEDURE — 99214 OFFICE O/P EST MOD 30 MIN: CPT | Performed by: PHYSICIAN ASSISTANT

## 2020-11-04 RX ORDER — ASPIRIN 81 MG/1
81 TABLET ORAL DAILY
Qty: 30 TABLET | Refills: 5 | Status: SHIPPED | OUTPATIENT
Start: 2020-11-04 | End: 2022-08-18 | Stop reason: SDUPTHER

## 2020-11-04 RX ORDER — ATORVASTATIN CALCIUM 40 MG/1
40 TABLET, FILM COATED ORAL DAILY
Qty: 30 TABLET | Refills: 11 | Status: SHIPPED | OUTPATIENT
Start: 2020-11-04 | End: 2021-10-04

## 2020-11-04 RX ORDER — LISINOPRIL 10 MG/1
10 TABLET ORAL DAILY
Qty: 60 TABLET | Refills: 5 | Status: SHIPPED | OUTPATIENT
Start: 2020-11-04 | End: 2021-01-18

## 2020-11-04 NOTE — PROGRESS NOTES
Problem list     Subjective   Radha Jung is a 85 y.o. female     Chief Complaint   Patient presents with   • Follow-up   PROBLEM LIST:      1.  Palpitations  1.1 Holter monitor 12/14/16-PVCs, PACs, 7 short runs of SVT  2.  Preserved systolic function  3. Fatigue   4.  TIA  5.  Stress test 10/31/18-no evidence of ischemia, preserved posterior LVEF of 61%  6.  Carotid artery disease  6.1 nonobstructive carotid artery stenosis by ultrasound October 2018       HPI    Patient is a 85-year-old female that presents to the office after hospitalization.    Patient describes waking up on Sunday.  She felt weak on her left side.  She was not able to put all of her weight on her leg.  She did not describe any trouble speaking or visual abnormalities.   was concerned.  Patient felt poorly.  The next day, they went to see a chiropractor per their report.  He states that she complains of lower back issues and felt that this may be related to some type of neuropathic issue.  Chiropractor felt that this possibly could represent a CVA.  It was recommended she see primary.  Apparently, the primary was not in yesterday.  She did not go to the hospital.  On Monday, she did fall and went to the emergency room and had a chest x-ray to look for any evidence of rib fracture.    Patient is sitting in the exam room today feeling well.  She continues to feel weakness in her left side.  Her  strength on her left arm is weak.  She cannot use her left leg as much she had in the past.  She describes diffuse total body pain at least to my understanding.  She describes pressure in multiple places in her body including her chest and arms and legs.  She does have mild levels of exertional dyspnea.  She does not describe progressive shortness of breath.  She does not describe PND orthopnea.    She has had palpitations and they have been chronic.  As above, she had monitor in the past showing frequent premature beats as well as short burst  of SVT.  She does not describe any presyncope or syncope.  Otherwise a stable    Current Outpatient Medications on File Prior to Visit   Medication Sig Dispense Refill   • azithromycin (ZITHROMAX) 250 MG tablet Take 2 tablets the first day, then 1 tablet daily for 4 days. 6 tablet 0   • CloNIDine (CATAPRES) 0.1 MG tablet Take TID PRN for BP higher than top #160, bottom # 90 30 tablet 5   • furosemide (LASIX) 20 MG tablet Take 1 tablet by mouth Daily As Needed (For 2 lb weight gain overnight and/or swelling in legs). 30 tablet 11   • guaiFENesin (MUCINEX) 600 MG 12 hr tablet Take 1,200 mg by mouth 2 (Two) Times a Day.     • nitroglycerin (NITROSTAT) 0.4 MG SL tablet Place 0.4 mg under the tongue Every 5 (Five) Minutes As Needed for Chest Pain.     • [DISCONTINUED] aspirin 81 MG tablet Take 1 tablet by mouth Daily. 30 tablet 11     No current facility-administered medications on file prior to visit.        Patient has no known allergies.    Past Medical History:   Diagnosis Date   • Asthma    • Breast cancer (CMS/HCC)    • Colon cancer (CMS/HCC)    • Hyperlipidemia    • TIA (transient ischemic attack)        Social History     Socioeconomic History   • Marital status:      Spouse name: Not on file   • Number of children: Not on file   • Years of education: Not on file   • Highest education level: Not on file   Tobacco Use   • Smoking status: Former Smoker     Types: Cigarettes   • Smokeless tobacco: Never Used   • Tobacco comment: -EL 2/12/19    Substance and Sexual Activity   • Alcohol use: Yes     Comment: Social   • Drug use: No   • Sexual activity: Defer       Family History   Problem Relation Age of Onset   • No Known Problems Mother    • Asthma Father        Review of Systems   Respiratory: Positive for shortness of breath. Negative for chest tightness and wheezing.    Cardiovascular: Positive for chest pain and palpitations. Negative for leg swelling.   Endocrine: Negative.    Musculoskeletal: Positive  "for back pain, neck pain and neck stiffness.   Skin: Positive for rash and wound (wound on face from falling- result of TIA).   Allergic/Immunologic: Positive for environmental allergies (seasonal).   Neurological: Positive for syncope (believes due to TIA), weakness, light-headedness, numbness (right hand) and headaches. Negative for dizziness.   Hematological: Bruises/bleeds easily.   Psychiatric/Behavioral: Positive for sleep disturbance (staying asleep. maame SoCHAYITO at HS).   All other systems reviewed and are negative.      Objective   Vitals:    11/04/20 0947   BP: 150/90   Pulse: 72   SpO2: 96%   Weight: 58.5 kg (129 lb)   Height: 157.5 cm (62.01\")      /90   Pulse 72   Ht 157.5 cm (62.01\")   Wt 58.5 kg (129 lb)   SpO2 96%   BMI 23.59 kg/m²     Lab Results (most recent)     None          Physical Exam  Vitals signs and nursing note reviewed.   Constitutional:       General: She is not in acute distress.     Appearance: Normal appearance. She is well-developed.   HENT:      Head: Normocephalic and atraumatic.   Eyes:      General: No scleral icterus.        Right eye: No discharge.         Left eye: No discharge.      Conjunctiva/sclera: Conjunctivae normal.   Neck:      Vascular: No carotid bruit.   Cardiovascular:      Rate and Rhythm: Normal rate and regular rhythm.      Heart sounds: Normal heart sounds. No murmur. No friction rub. No gallop.    Pulmonary:      Effort: Pulmonary effort is normal. No respiratory distress.      Breath sounds: Normal breath sounds. No wheezing or rales.   Chest:      Chest wall: No tenderness.   Musculoskeletal:      Right lower leg: No edema.      Left lower leg: No edema.   Skin:     General: Skin is warm and dry.      Coloration: Skin is not pale.      Findings: No erythema or rash.   Neurological:      Mental Status: She is alert and oriented to person, place, and time.      Cranial Nerves: Cranial nerve deficit present.      Motor: Weakness present.      " Comments: Left-sided hemineglect with decreased  strength on the left approximately 3/5.  Unable to lift left lower extremity   Psychiatric:         Behavior: Behavior normal.         Procedure   Procedures       Assessment/Plan     Problems Addressed this Visit        Cardiovascular and Mediastinum    Palpitations    Relevant Orders    Adult Transthoracic Echo Complete W/ Cont if Necessary Per Protocol    Cardiac Event Monitor    CT Head Without Contrast    TIA (transient ischemic attack) - Primary    Relevant Orders    Adult Transthoracic Echo Complete W/ Cont if Necessary Per Protocol    Cardiac Event Monitor    CT Head Without Contrast    Duplex Carotid Ultrasound CAR       Respiratory    Shortness of breath    Relevant Orders    Adult Transthoracic Echo Complete W/ Cont if Necessary Per Protocol    Cardiac Event Monitor    CT Head Without Contrast      Other Visit Diagnoses     Essential hypertension        Relevant Medications    lisinopril (PRINIVIL,ZESTRIL) 10 MG tablet    Other Relevant Orders    Adult Transthoracic Echo Complete W/ Cont if Necessary Per Protocol    Cardiac Event Monitor    CT Head Without Contrast    Carotid bruit, unspecified laterality        Relevant Orders    Duplex Carotid Ultrasound CAR      Diagnoses       Codes Comments    TIA (transient ischemic attack)    -  Primary ICD-10-CM: G45.9  ICD-9-CM: 435.9     Palpitations     ICD-10-CM: R00.2  ICD-9-CM: 785.1     Shortness of breath     ICD-10-CM: R06.02  ICD-9-CM: 786.05     Essential hypertension     ICD-10-CM: I10  ICD-9-CM: 401.9     Carotid bruit, unspecified laterality     ICD-10-CM: R09.89  ICD-9-CM: 785.9           Recommendation  1.  Patient is a 85-year-old female that presents to the office after what appears to be a cerebrovascular accident.  She did not go to the hospital.  It has been since this weekend since symptoms have occurred.  Her residual symptoms appear to be left-sided hemineglect.  She would be outside of  the window for any type of TPA.  She does not have any dysarthria or visual abnormalities.    2.  I am trying to get a stat CT of the head to confirm what we suspect.  She will ultimately likely need an MRI.    3.  I am obtaining an echocardiogram with contrast to look for any evidence of septal defects.  We would also like to rule out mural thrombus    4.  I am scheduling 30-day event monitor to rule out any arrhythmic substrate    5.  I am starting her on aspirin and statin therapy as she was not on either prior to CVA.  Patient will be on medications to help with hypertension.  We will prescribe lisinopril.    6.  Patient had been taking Eliquis, that her  had, because of her possible stroke.  I instructed them not to do that.  At this point, we have not ruled out that this could be a hemorrhagic event    7.  Stat CT of the head to be performed today we will direct further from that time.  We will send results to primary.  We have tried to contact their office as she will need close follow-up.  She may likely need neurology referral.  I want to see her back soon possibly next week.  She is to follow with primary scheduled.  Extensive discussion given that if she has any worsening symptoms as discussed with her to suggest stroke, she is to contact emergency medical services.  They verbalized understanding and acknowledged.  We will see her back for follow-up.  She is to follow with primary as scheduled           Radha Jung  reports that she has quit smoking. Her smoking use included cigarettes. She has never used smokeless tobacco..        Patient's Body mass index is 23.59 kg/m². BMI is within normal parameters. No follow-up required..     Advance Care Planning   ACP discussion was declined by the patient. Patient has an advance directive (not in EMR), copy requested.  Electronically signed by:

## 2020-11-04 NOTE — PATIENT INSTRUCTIONS
How to Quarantine at Home  Information for Patients and Families    These instructions are for people with confirmed or suspected COVID-19 who do not need to be hospitalized and those with confirmed COVID-19 who were hospitalized and discharged to care for themselves at home.    If you were tested through the Health Department  The Health Department will monitor your wellbeing.  If it is determined that you do not need to be hospitalized and can be isolated at home, you will be monitored by staff from your local or state health department.     If you were tested through a Commercial Lab  You will need to monitor yourself and report changes in your symptoms to your doctor.  See the section below called Monitor Your Symptoms.    Follow these steps until a healthcare provider or local or state health department says you can return to your normal activities.    Stay home except to get medical care  • Restrict activities outside your home, except for getting medical care.   • Do not go to work, school, or public areas.   • Avoid using public transportation, ride-sharing, or taxis.    Separate yourself from other people and animals in your home  People  As much as possible, you should stay in a specific room and away from other people in your home. Also, you should use a separate bathroom, if available.    Animals  You should restrict contact with pets and other animals while you are sick with COVID-19, just like you would around other people. When possible, have another member of your household care for your animals while you are sick. If you are sick with COVID-19, avoid contact with your pet, including petting, snuggling, being kissed or licked, and sharing food. If you must care for your pet or be around animals while you are sick, wash your hands before and after you interact with pets and wear a facemask. See COVID-19 and Animals for more information.    Call ahead before visiting your doctor  If you have a medical  appointment, call the healthcare provider and tell them that you have or may have COVID-19. This information will help the healthcare provider’s office take steps to keep other people from getting infected or exposed.    Wear a facemask  You should wear a facemask when you are around other people (e.g., sharing a room or vehicle) or pets and before you enter a healthcare provider’s office.     If you are not able to wear a facemask (for example, because it causes trouble breathing), then people who live with you should not stay in the same room with you, or they should wear a facemask if they enter your room.    Cover your coughs and sneezes  • Cover your mouth and nose with a tissue when you cough or sneeze.   • Throw used tissues in a lined trash can.   • Immediately wash your hands with soap and water for at least 20 seconds or, if soap and water are not available, clean your hands with an alcohol-based hand  that contains at least 60% alcohol.    Clean your hands often  • Wash your hands often with soap and water for at least 20 seconds, especially after blowing your nose, coughing, or sneezing; going to the bathroom; and before eating or preparing food.     • If soap and water are not readily available, use an alcohol-based hand  with at least 60% alcohol, covering all surfaces of your hands and rubbing them together until they feel dry.    • Soap and water are the best option if hands are visibly dirty. Avoid touching your eyes, nose, and mouth with unwashed hands.    Avoid sharing personal household items  • You should not share dishes, drinking glasses, cups, eating utensils, towels, or bedding with other people or pets in your home.   • After using these items, they should be washed thoroughly with soap and water.    Clean all “high-touch” surfaces everyday  • High touch surfaces include counters, tabletops, doorknobs, bathroom fixtures, toilets, phones, keyboards, tablets, and bedside  tables.   • Also, clean any surfaces that may have blood, stool, or body fluids on them.   • Use a household cleaning spray or wipe, according to the label instructions. Labels contain instructions for safe and effective use of the cleaning product, including precautions you should take when applying the product, such as wearing gloves and making sure you have good ventilation during use of the product.    Monitor your symptoms  • Seek prompt medical attention if your illness is worsening (e.g., difficulty breathing).   • Before seeking care, call your healthcare provider and tell them that you have, or are being evaluated for, COVID-19.   • Put on a facemask before you enter the facility.     • These steps will help the healthcare provider’s office to keep other people in the office or waiting room from getting infected or exposed.   • Persons who are placed under active monitoring or facilitated self-monitoring should follow instructions provided by their local health department or occupational health professionals, as appropriate.  • If you have a medical emergency and need to call 911, notify the dispatch personnel that you have, or are being evaluated for COVID-19. If possible, put on a facemask before emergency medical services arrive.    Discontinuing home isolation  Patients with confirmed COVID-19 should remain under home isolation precautions until the risk of secondary transmission to others is thought to be low. The decision to discontinue home isolation precautions should be made on a case-by-case basis, in consultation with healthcare providers and state and local health departments.    The below content are for household members, intimate partners, and caregivers of a patient with symptomatic laboratory-confirmed COVID-19 or a patient under investigation:    Household members, intimate partners, and caregivers may have close contact with a person with symptomatic, laboratory-confirmed COVID-19 or a  person under investigation.     Close contacts should monitor their health; they should call their healthcare provider right away if they develop symptoms suggestive of COVID-19 (e.g., fever, cough, shortness of breath)     Close contacts should also follow these recommendations:  • Make sure that you understand and can help the patient follow their healthcare provider’s instructions for medication(s) and care. You should help the patient with basic needs in the home and provide support for getting groceries, prescriptions, and other personal needs.  • Monitor the patient’s symptoms. If the patient is getting sicker, call his or her healthcare provider and tell them that the patient has laboratory-confirmed COVID-19. This will help the healthcare provider’s office take steps to keep other people in the office or waiting room from getting infected. Ask the healthcare provider to call the local or [facility] health department for additional guidance. If the patient has a medical emergency and you need to call 911, notify the dispatch personnel that the patient has, or is being evaluated for COVID-19.  • Household members should stay in another room or be  from the patient as much as possible. Household members should use a separate bedroom and bathroom, if available.  • Prohibit visitors who do not have an essential need to be in the home.  • Household members should care for any pets in the home. Do not handle pets or other animals while sick.  For more information, see COVID-19 and Animals.  • Make sure that shared spaces in the home have good air flow, such as by an air conditioner or an opened window, weather permitting.  • Perform hand hygiene frequently. Wash your hands often with soap and water for at least 20 seconds or use an alcohol-based hand  that contains 60 to 95% alcohol, covering all surfaces of your hands and rubbing them together until they feel dry. Soap and water should be used  preferentially if hands are visibly dirty.  • Avoid touching your eyes, nose, and mouth with unwashed hands.  • The patient should wear a facemask when you are around other people. If the patient is not able to wear a facemask (for example, because it causes trouble breathing), you, as the caregiver, should wear a mask when you are in the same room as the patient.  • Wear a disposable facemask and gloves when you touch or have contact with the patient’s blood, stool, or body fluids, such as saliva, sputum, nasal mucus, vomit, or urine.   o Throw out disposable facemasks and gloves after using them. Do not reuse.  o When removing personal protective equipment, first remove and dispose of gloves. Then, immediately clean your hands with soap and water or alcohol-based hand . Next, remove and dispose of facemask, and immediately clean your hands again with soap and water or alcohol-based hand .  • Avoid sharing household items with the patient. You should not share dishes, drinking glasses, cups, eating utensils, towels, bedding, or other items. After the patient uses these items, you should wash them thoroughly (see below “Wash laundry thoroughly”).  • Clean all “high-touch” surfaces, such as counters, tabletops, doorknobs, bathroom fixtures, toilets, phones, keyboards, tablets, and bedside tables, every day. Also, clean any surfaces that may have blood, stool, or body fluids on them.   o Use a household cleaning spray or wipe, according to the label instructions. Labels contain instructions for safe and effective use of the cleaning product including precautions you should take when applying the product, such as wearing gloves and making sure you have good ventilation during use of the product.  • Wash laundry thoroughly.   o Immediately remove and wash clothes or bedding that have blood, stool, or body fluids on them.  o Wear disposable gloves while handling soiled items and keep soiled items away  from your body. Clean your hands (with soap and water or an alcohol-based hand ) immediately after removing your gloves.  o Read and follow directions on labels of laundry or clothing items and detergent. In general, using a normal laundry detergent according to washing machine instructions and dry thoroughly using the warmest temperatures recommended on the clothing label.  • Place all used disposable gloves, facemasks, and other contaminated items in a lined container before disposing of them with other household waste. Clean your hands (with soap and water or an alcohol-based hand ) immediately after handling these items. Soap and water should be used preferentially if hands are visibly dirty.  • Discuss any additional questions with your state or local health department or healthcare provider.    Adapted from information provided by the Centers for Disease Control and Prevention.  For more information, visit https://www.cdc.gov/coronavirus/2019-ncov/hcp/guidance-prevent-spread.html  Advance Care Planning and Advance Directives     You make decisions on a daily basis - decisions about where you want to live, your career, your home, your life. Perhaps one of the most important decisions you face is your choice for future medical care. Take time to talk with your family and your healthcare team and start planning today.  Advance Care Planning is a process that can help you:  · Understand possible future healthcare decisions in light of your own experiences  · Reflect on those decision in light of your goals and values  · Discuss your decisions with those closest to you and the healthcare professionals that care for you  · Make a plan by creating a document that reflects your wishes    Surrogate Decision Maker  In the event of a medical emergency, which has left you unable to communicate or to make your own decisions, you would need someone to make decisions for you.  It is important to discuss  your preferences for medical treatment with this person while you are in good health.     Qualities of a surrogate decision maker:  • Willing to take on this role and responsibility  • Knows what you want for future medical care  • Willing to follow your wishes even if they don't agree with them  • Able to make difficult medical decisions under stressful circumstances    Advance Directives  These are legal documents you can create that will guide your healthcare team and decision maker(s) when needed. These documents can be stored in the electronic medical record.    · Living Will - a legal document to guide your care if you have a terminal condition or a serious illness and are unable to communicate. States vary by statute in document names/types, but most forms may include one or more of the following:        -  Directions regarding life-prolonging treatments        -  Directions regarding artificially provided nutrition/hydration        -  Choosing a healthcare decision maker        -  Direction regarding organ/tissue donation    · Durable Power of  for Healthcare - this document names an -in-fact to make medical decisions for you, but it may also allow this person to make personal and financial decisions for you. Please seek the advice of an  if you need this type of document.    **Advance Directives are not required and no one may discriminate against you if you do not sign one.    Medical Orders  Many states allow specific forms/orders signed by your physician to record your wishes for medical treatment in your current state of health. This form, signed in personal communication with your physician, addresses resuscitation and other medical interventions that you may or may not want.      For more information or to schedule a time with a [facility] Advance Care Planning Facilitator contact: [email or web address] or call [phone] and someone will contact you directly.

## 2020-11-05 ENCOUNTER — TELEPHONE (OUTPATIENT)
Dept: CARDIOLOGY | Facility: CLINIC | Age: 85
End: 2020-11-05

## 2020-11-05 NOTE — TELEPHONE ENCOUNTER
I called patient to go over results of STAT head CT from yesterday. Upon answering the phone patient was obviously very short of breath. She stated she could not breathe or feel her arms or legs. I asked patient if she felt that we needed to call 911. Patient stated she did not know what to do and at this point I asked Rebecca to assist. At this time, Rebecca took over the phone call. See additional notes from her re: remainder of call.

## 2020-11-05 NOTE — TELEPHONE ENCOUNTER
"Miriam contacted pt regd her CT head results, while on the phone w/ pt, she informed Miriam that she had lost the ability to move her extremities.    Miriam asked that I assist w/ call:  I asked pt if she was alone, she stated she was as her  stepped out. I asked pt if she was able to move her extremities, she stated that she had to use both hands and drink her cereal this am. I confirmed w/ her that she was able to move her extremities, just not well. I asked if she felt an ambulance was needed. She stated \"MY  and I can call an ambulance, we're not stupid.\" I informed her that I wasn't saying that, that I just am trying to make sure she gets the assistance she needs.   I informed her that Toño is suggesting that we do a STAT MRI of her brain due to the swelling found behind her eye. Pt states she had brain imaging yesterday. I verbalized understanding and informed her that this would be a different type of imaging. Informed her that her CT head showed no evidence of stroke, which is good, but that it showed swelling behind one eye, which was concerning. Pt stated that I didn't realized how long a drive it is for her and how long they made her wait yesterday and that it was inconvenient. I verbalized understanding and apologized for that, but stated that her health is our main priority and that I unfortunately can't assist w/ the distance or wait at the hospital.   Pt stated that she has an eye infection and that's what's causing the swelling. I informed her that further testing is her choice, I'm just making her aware of what the provider thinks is best for her health. She reiterated that she \"tried to tell you all\" about her eye infection and that's why there's swelling. Pt does not wish to move forward w/ testing and ended call abruptly.   -Rebecca Osborne, RMA 9:45am 11/5/2020  "

## 2020-11-06 ENCOUNTER — TELEPHONE (OUTPATIENT)
Dept: CARDIOLOGY | Facility: CLINIC | Age: 85
End: 2020-11-06

## 2020-11-06 NOTE — TELEPHONE ENCOUNTER
Patients  called and said that they didn't get to go to the CT Head w/o contrast at the hospital because they were not notified in a timely manor.     He was extremely upset, wanted to talk to Toño himself because his wife is getting worse but I told him he doesn't work on Fridays.      He said ok and hung up.

## 2020-11-06 NOTE — TELEPHONE ENCOUNTER
I called patient yesterday to go over results of CT Head and that Toño wanted her to go to [facility] for a STAT Brain MRI. There was no appointment time, patient was advised just to go to the hospital. Patient became very agitated and refused going to the hospital. Patient did not want to speak with Rebecca regarding this any further.     Sussy spoke with patients , she clarified that patient does need to have MRI. They do not want to travel to [facility] for testing so we will attempt to find somewhere in [facility] who will perform MRI.    Patients  states that she is having to crawl everywhere. She said  attempted to call an ambulance for her at 3am and patient refused.    Sussy got in touch with [facility] in [place] who stated that patient would be added on today and could come right now. Sussy spoke with , he is aware of appointment and location and verbalized understanding.

## 2020-11-10 ENCOUNTER — TELEPHONE (OUTPATIENT)
Dept: CARDIOLOGY | Facility: CLINIC | Age: 85
End: 2020-11-10

## 2020-11-10 NOTE — TELEPHONE ENCOUNTER
Johanne @ [facility] left a VM requesting a return call regarding this pt.  VM left for Johanne.

## 2020-11-11 ENCOUNTER — HOSPITAL ENCOUNTER (OUTPATIENT)
Dept: CARDIOLOGY | Facility: HOSPITAL | Age: 85
Discharge: HOME | End: 2020-11-11

## 2020-11-11 DIAGNOSIS — G45.9 TIA (TRANSIENT ISCHEMIC ATTACK): ICD-10-CM

## 2020-11-11 DIAGNOSIS — R06.02 SHORTNESS OF BREATH: ICD-10-CM

## 2020-11-11 DIAGNOSIS — R09.89 CAROTID BRUIT, UNSPECIFIED LATERALITY: ICD-10-CM

## 2020-11-11 DIAGNOSIS — I10 ESSENTIAL HYPERTENSION: ICD-10-CM

## 2020-11-11 DIAGNOSIS — R00.2 PALPITATIONS: ICD-10-CM

## 2020-11-11 PROCEDURE — 93880 EXTRACRANIAL BILAT STUDY: CPT | Performed by: INTERNAL MEDICINE

## 2020-11-11 PROCEDURE — 93306 TTE W/DOPPLER COMPLETE: CPT | Performed by: INTERNAL MEDICINE

## 2020-11-11 PROCEDURE — 93880 EXTRACRANIAL BILAT STUDY: CPT

## 2020-11-11 PROCEDURE — 93306 TTE W/DOPPLER COMPLETE: CPT

## 2020-11-11 RX ORDER — SODIUM CHLORIDE 9 MG/ML
8 INJECTION INTRAMUSCULAR; INTRAVENOUS; SUBCUTANEOUS ONCE AS NEEDED
Status: COMPLETED | OUTPATIENT
Start: 2020-11-11 | End: 2020-11-11

## 2020-11-11 RX ADMIN — SODIUM CHLORIDE 8 ML: 9 INJECTION, SOLUTION INTRAMUSCULAR; INTRAVENOUS; SUBCUTANEOUS at 12:15

## 2020-11-13 ENCOUNTER — TELEPHONE (OUTPATIENT)
Dept: CARDIOLOGY | Facility: CLINIC | Age: 85
End: 2020-11-13

## 2020-11-13 NOTE — TELEPHONE ENCOUNTER
The pt's PCP contacted HENOK Bautista to notify him pt is having issues with the cardiac monitor.  The PM called the pt, she reports inability to wear the monitor due to sensitivity of the mastectomy site. The pt will send the monitor back and call our office if the sensitivity improves and she wants to attempt to proceed with the monitor.

## 2020-11-22 LAB
BH CV ECHO MEAS - ACS: 2 CM
BH CV ECHO MEAS - AO MAX PG: 5.6 MMHG
BH CV ECHO MEAS - AO MEAN PG: 3 MMHG
BH CV ECHO MEAS - AO ROOT AREA (BSA CORRECTED): 2
BH CV ECHO MEAS - AO ROOT AREA: 8 CM^2
BH CV ECHO MEAS - AO ROOT DIAM: 3.2 CM
BH CV ECHO MEAS - AO V2 MAX: 118 CM/SEC
BH CV ECHO MEAS - AO V2 MEAN: 84.7 CM/SEC
BH CV ECHO MEAS - AO V2 VTI: 26.2 CM
BH CV ECHO MEAS - BSA(HAYCOCK): 1.6 M^2
BH CV ECHO MEAS - BSA(HAYCOCK): 1.6 M^2
BH CV ECHO MEAS - BSA: 1.6 M^2
BH CV ECHO MEAS - BSA: 1.6 M^2
BH CV ECHO MEAS - BZI_BMI: 23.6 KILOGRAMS/M^2
BH CV ECHO MEAS - BZI_BMI: 23.6 KILOGRAMS/M^2
BH CV ECHO MEAS - BZI_METRIC_HEIGHT: 157.5 CM
BH CV ECHO MEAS - BZI_METRIC_HEIGHT: 157.5 CM
BH CV ECHO MEAS - BZI_METRIC_WEIGHT: 58.5 KG
BH CV ECHO MEAS - BZI_METRIC_WEIGHT: 58.5 KG
BH CV ECHO MEAS - EDV(CUBED): 81.7 ML
BH CV ECHO MEAS - EDV(MOD-SP4): 41.3 ML
BH CV ECHO MEAS - EDV(TEICH): 84.9 ML
BH CV ECHO MEAS - EF(CUBED): 66.3 %
BH CV ECHO MEAS - EF(TEICH): 58.1 %
BH CV ECHO MEAS - ESV(CUBED): 27.5 ML
BH CV ECHO MEAS - ESV(MOD-SP4): 29.6 ML
BH CV ECHO MEAS - ESV(TEICH): 35.6 ML
BH CV ECHO MEAS - FS: 30.4 %
BH CV ECHO MEAS - IVS/LVPW: 1.6
BH CV ECHO MEAS - IVSD: 1.6 CM
BH CV ECHO MEAS - LA DIMENSION: 3.7 CM
BH CV ECHO MEAS - LA/AO: 1.2
BH CV ECHO MEAS - LV DIASTOLIC VOL/BSA (35-75): 26 ML/M^2
BH CV ECHO MEAS - LV IVRT: 0.11 SEC
BH CV ECHO MEAS - LV MASS(C)D: 218 GRAMS
BH CV ECHO MEAS - LV MASS(C)DI: 137.4 GRAMS/M^2
BH CV ECHO MEAS - LV SYSTOLIC VOL/BSA (12-30): 18.7 ML/M^2
BH CV ECHO MEAS - LVIDD: 4.3 CM
BH CV ECHO MEAS - LVIDS: 3 CM
BH CV ECHO MEAS - LVLD AP4: 5.4 CM
BH CV ECHO MEAS - LVLS AP4: 5.2 CM
BH CV ECHO MEAS - LVOT AREA (M): 3.5 CM^2
BH CV ECHO MEAS - LVOT AREA: 3.5 CM^2
BH CV ECHO MEAS - LVOT DIAM: 2.1 CM
BH CV ECHO MEAS - LVPWD: 1 CM
BH CV ECHO MEAS - MV A MAX VEL: 130 CM/SEC
BH CV ECHO MEAS - MV DEC SLOPE: 255 CM/SEC^2
BH CV ECHO MEAS - MV E MAX VEL: 92.1 CM/SEC
BH CV ECHO MEAS - MV E/A: 0.71
BH CV ECHO MEAS - RAP SYSTOLE: 10 MMHG
BH CV ECHO MEAS - RVDD: 2.6 CM
BH CV ECHO MEAS - RVSP: 34.2 MMHG
BH CV ECHO MEAS - SI(AO): 132.8 ML/M^2
BH CV ECHO MEAS - SI(CUBED): 34.2 ML/M^2
BH CV ECHO MEAS - SI(MOD-SP4): 7.4 ML/M^2
BH CV ECHO MEAS - SI(TEICH): 31.1 ML/M^2
BH CV ECHO MEAS - SV(AO): 210.7 ML
BH CV ECHO MEAS - SV(CUBED): 54.2 ML
BH CV ECHO MEAS - SV(MOD-SP4): 11.7 ML
BH CV ECHO MEAS - SV(TEICH): 49.3 ML
BH CV ECHO MEAS - TR MAX VEL: 246 CM/SEC
BH CV XLRA MEAS LEFT BULB EDV: -17.3 CM/SEC
BH CV XLRA MEAS LEFT BULB PSV: -71.5 CM/SEC
BH CV XLRA MEAS LEFT CCA RATIO VEL: -57.8 CM/SEC
BH CV XLRA MEAS LEFT DIST CCA EDV: -13.8 CM/SEC
BH CV XLRA MEAS LEFT DIST CCA PSV: -58.1 CM/SEC
BH CV XLRA MEAS LEFT DIST ICA EDV: -23.2 CM/SEC
BH CV XLRA MEAS LEFT DIST ICA PSV: -80.5 CM/SEC
BH CV XLRA MEAS LEFT ICA RATIO VEL: -80.1 CM/SEC
BH CV XLRA MEAS LEFT ICA/CCA RATIO: 1.4
BH CV XLRA MEAS LEFT MID ICA EDV: -25.5 CM/SEC
BH CV XLRA MEAS LEFT MID ICA PSV: -73.9 CM/SEC
BH CV XLRA MEAS LEFT PROX CCA EDV: 14.1 CM/SEC
BH CV XLRA MEAS LEFT PROX CCA PSV: 68 CM/SEC
BH CV XLRA MEAS LEFT PROX ECA EDV: -9.4 CM/SEC
BH CV XLRA MEAS LEFT PROX ECA PSV: -62.9 CM/SEC
BH CV XLRA MEAS LEFT PROX ICA EDV: 16.9 CM/SEC
BH CV XLRA MEAS LEFT PROX ICA PSV: 51.9 CM/SEC
BH CV XLRA MEAS LEFT VERTEBRAL A EDV: 14.1 CM/SEC
BH CV XLRA MEAS LEFT VERTEBRAL A PSV: 51.1 CM/SEC
BH CV XLRA MEAS RIGHT BULB EDV: -7.9 CM/SEC
BH CV XLRA MEAS RIGHT BULB PSV: -51.1 CM/SEC
BH CV XLRA MEAS RIGHT CCA RATIO VEL: 62.9 CM/SEC
BH CV XLRA MEAS RIGHT DIST CCA EDV: 16.1 CM/SEC
BH CV XLRA MEAS RIGHT DIST CCA PSV: 63.5 CM/SEC
BH CV XLRA MEAS RIGHT DIST ICA EDV: -20.4 CM/SEC
BH CV XLRA MEAS RIGHT DIST ICA PSV: -61.3 CM/SEC
BH CV XLRA MEAS RIGHT ICA RATIO VEL: -67.2 CM/SEC
BH CV XLRA MEAS RIGHT ICA/CCA RATIO: -1.1
BH CV XLRA MEAS RIGHT MID ICA EDV: -18.5 CM/SEC
BH CV XLRA MEAS RIGHT MID ICA PSV: -67.6 CM/SEC
BH CV XLRA MEAS RIGHT PROX CCA EDV: 25.5 CM/SEC
BH CV XLRA MEAS RIGHT PROX CCA PSV: 143.4 CM/SEC
BH CV XLRA MEAS RIGHT PROX ECA EDV: -11.6 CM/SEC
BH CV XLRA MEAS RIGHT PROX ECA PSV: -51.5 CM/SEC
BH CV XLRA MEAS RIGHT PROX ICA EDV: -13.2 CM/SEC
BH CV XLRA MEAS RIGHT PROX ICA PSV: -45.6 CM/SEC
BH CV XLRA MEAS RIGHT VERTEBRAL A EDV: 6.3 CM/SEC
BH CV XLRA MEAS RIGHT VERTEBRAL A PSV: 38.9 CM/SEC
MAXIMAL PREDICTED HEART RATE: 137 BPM
STRESS TARGET HR: 116 BPM

## 2020-12-09 ENCOUNTER — APPOINTMENT (OUTPATIENT)
Dept: CARDIOLOGY | Facility: HOSPITAL | Age: 85
End: 2020-12-09

## 2021-01-18 ENCOUNTER — TELEPHONE (OUTPATIENT)
Dept: CARDIOLOGY | Facility: CLINIC | Age: 86
End: 2021-01-18

## 2021-01-18 ENCOUNTER — OFFICE VISIT (OUTPATIENT)
Dept: CARDIOLOGY | Facility: CLINIC | Age: 86
End: 2021-01-18

## 2021-01-18 VITALS
HEIGHT: 62 IN | HEART RATE: 79 BPM | WEIGHT: 131.4 LBS | BODY MASS INDEX: 24.18 KG/M2 | DIASTOLIC BLOOD PRESSURE: 91 MMHG | OXYGEN SATURATION: 98 % | SYSTOLIC BLOOD PRESSURE: 169 MMHG

## 2021-01-18 DIAGNOSIS — R00.2 PALPITATIONS: ICD-10-CM

## 2021-01-18 DIAGNOSIS — I63.9 CEREBROVASCULAR ACCIDENT (CVA), UNSPECIFIED MECHANISM (HCC): Primary | ICD-10-CM

## 2021-01-18 DIAGNOSIS — R06.02 SHORTNESS OF BREATH: ICD-10-CM

## 2021-01-18 DIAGNOSIS — R07.9 CHEST PAIN, UNSPECIFIED TYPE: ICD-10-CM

## 2021-01-18 PROCEDURE — 99214 OFFICE O/P EST MOD 30 MIN: CPT | Performed by: PHYSICIAN ASSISTANT

## 2021-01-18 RX ORDER — LISINOPRIL 10 MG/1
10 TABLET ORAL 2 TIMES DAILY
Qty: 60 TABLET | Refills: 5 | Status: SHIPPED | OUTPATIENT
Start: 2021-01-18 | End: 2021-06-08

## 2021-01-18 NOTE — TELEPHONE ENCOUNTER
Pt spouse contacts office stating pt had stroke several weeks ago.  Reports pt is SOB and places her hand over the left side of her chest while she c/o cp.  States pt did not take nitro.  Due to mental changes caused by the stroke, the pt has been placing Asa 325 mg under her tongue.  This has been ongoing couple days. Unsure how many asa pt has used.  Pt  made aware of risk while using large number of asa.  Encouraged to seek medical attention.  States he will take her to see her family PCP.  He did not want to take her to the hospital.     Discussed w/ HENOK Bautista.  Recommendations include:  Have pt to come in.  Give her an appt w/ me sometime this week.  Contacted pt.  Spoke w/ Jc pt spouse.  Given appt for 01/19/21 @ 1:15 PM.  Pt and spouse agree to keep this appt.  Seek medical attention if any new or worsening symptoms. Verbalizes understanding.

## 2021-02-09 ENCOUNTER — OFFICE VISIT (OUTPATIENT)
Dept: CARDIOLOGY | Facility: CLINIC | Age: 86
End: 2021-02-09

## 2021-02-09 VITALS
BODY MASS INDEX: 24.29 KG/M2 | SYSTOLIC BLOOD PRESSURE: 141 MMHG | HEART RATE: 72 BPM | OXYGEN SATURATION: 97 % | WEIGHT: 132 LBS | DIASTOLIC BLOOD PRESSURE: 80 MMHG | HEIGHT: 62 IN

## 2021-02-09 DIAGNOSIS — R07.9 CHEST PAIN, UNSPECIFIED TYPE: Primary | ICD-10-CM

## 2021-02-09 DIAGNOSIS — R00.2 PALPITATIONS: ICD-10-CM

## 2021-02-09 DIAGNOSIS — R06.02 SHORTNESS OF BREATH: ICD-10-CM

## 2021-02-09 PROCEDURE — 99214 OFFICE O/P EST MOD 30 MIN: CPT | Performed by: PHYSICIAN ASSISTANT

## 2021-02-09 RX ORDER — CLOPIDOGREL BISULFATE 75 MG/1
75 TABLET ORAL DAILY
COMMUNITY
End: 2022-02-01 | Stop reason: SDUPTHER

## 2021-02-09 RX ORDER — METOPROLOL SUCCINATE 25 MG/1
25 TABLET, EXTENDED RELEASE ORAL DAILY
Qty: 30 TABLET | Refills: 11 | Status: SHIPPED | OUTPATIENT
Start: 2021-02-09 | End: 2022-01-04

## 2021-02-09 NOTE — PATIENT INSTRUCTIONS

## 2021-02-09 NOTE — PROGRESS NOTES
Problem list     Subjective   Radha Jung is a 85 y.o. female     Chief Complaint   Patient presents with   • Follow-up   • Cerebrovascular Accident   • Transient Ischemic Attack   Problem list  1.  CVA  1.1 left-sided CVA diagnosed November 2020.  1.1 carotid duplex showing nonobstructive disease bilaterally November 2020  1.2 echocardiogram not demonstrating any evidence of septal defect or evidence of mural thrombus  2.  Preserved systolic function  3.  Stress test 2018 showing no evidence of ischemia preserved LV function  4.  Fatigue  5.  Palpitations  5.1 Holter monitor 2016 demonstrated PVCs, PACs and short burst of SVT     HPI     Patient is an 85-year-old female that presents to the office for evaluation.  We initially saw this patient in the office after she had a cerebrovascular accident.  She did not go to the hospital for CVA.  Please see prior notes but apparently woke up on Sunday with left-sided weakness.  She ended up going to the chiropractor the next day and eventually made it to our office.  She had work-up scheduled.  We stayed in touch with primary and apparently scan showed a significant CVA.  Patient has since been seen by [facility].  Patient was not on aspirin and statin prior to her CVA and has been placed on those medications.  Event monitor scheduled with patient could not wear it apparently there was some issues with wearing the monitor.  According to the , she made it clear she does not want to do that.    Last office visit, we discussed the risk of stroke and the need to evaluate rhythm but were unsuccessful in convincing to consider.  Also, stress test was ordered because of patient's discomfort    Patient apparently has had breast surgery in the past.  She has had some discomfort on the left side of her chest.  Stress test was ordered to try to differentiate as she has some atypical discomfort at times but we would like to rule out ischemia as a potential  factor.    She continued feels this discomfort.  Her complaint is also shortness of breath.  She feels dyspnea when trying to exert or do activity and is felt this more post CVA.  She does not describe any PND orthopnea.  No significant edema.    Patient does occasionally feel palpitations.  She has history of arrhythmias in the past.  Patient does not describe any dizziness presyncope or syncope.  Otherwise patient is doing well.            Current Outpatient Medications on File Prior to Visit   Medication Sig Dispense Refill   • aspirin (aspirin) 81 MG EC tablet Take 1 tablet by mouth Daily. 30 tablet 5   • atorvastatin (LIPITOR) 40 MG tablet Take 1 tablet by mouth Daily. 30 tablet 11   • clopidogrel (PLAVIX) 75 MG tablet Take 75 mg by mouth Daily.     • lisinopril (PRINIVIL,ZESTRIL) 10 MG tablet Take 1 tablet by mouth 2 (two) times a day. 60 tablet 5   • nitroglycerin (NITROSTAT) 0.4 MG SL tablet Place 0.4 mg under the tongue Every 5 (Five) Minutes As Needed for Chest Pain.     • azithromycin (ZITHROMAX) 250 MG tablet Take 2 tablets the first day, then 1 tablet daily for 4 days. 6 tablet 0   • CloNIDine (CATAPRES) 0.1 MG tablet Take TID PRN for BP higher than top #160, bottom # 90 30 tablet 5   • furosemide (LASIX) 20 MG tablet Take 1 tablet by mouth Daily As Needed (For 2 lb weight gain overnight and/or swelling in legs). 30 tablet 11   • guaiFENesin (MUCINEX) 600 MG 12 hr tablet Take 1,200 mg by mouth 2 (Two) Times a Day.       No current facility-administered medications on file prior to visit.        Patient has no known allergies.    Past Medical History:   Diagnosis Date   • Asthma    • Breast cancer (CMS/HCC)    • Colon cancer (CMS/HCC)    • Hyperlipidemia    • TIA (transient ischemic attack)    • TIA (transient ischemic attack)        Social History     Socioeconomic History   • Marital status:      Spouse name: Not on file   • Number of children: Not on file   • Years of education: Not on file   •  "Highest education level: Not on file   Tobacco Use   • Smoking status: Former Smoker     Types: Cigarettes   • Smokeless tobacco: Never Used   • Tobacco comment: -EL 2/12/19    Substance and Sexual Activity   • Alcohol use: Yes     Comment: Social   • Drug use: No   • Sexual activity: Defer       Family History   Problem Relation Age of Onset   • No Known Problems Mother    • Asthma Father        Review of Systems   Constitutional: Negative for chills, fatigue and fever.   HENT: Positive for rhinorrhea. Negative for congestion and sore throat.    Eyes: Positive for visual disturbance (reading glasses).   Respiratory: Positive for chest tightness and shortness of breath. Negative for apnea.    Cardiovascular: Positive for chest pain (left sided cp- believes due to mastectomy) and palpitations. Negative for leg swelling.   Gastrointestinal: Negative.    Endocrine: Negative.    Genitourinary: Negative.    Musculoskeletal: Positive for arthralgias, neck pain and neck stiffness. Negative for back pain and gait problem.   Skin: Negative.  Negative for rash and wound.   Allergic/Immunologic: Negative.  Negative for environmental allergies and food allergies.   Neurological: Positive for weakness (in legs) and numbness (both hands- worse in R ). Negative for dizziness, light-headedness and headaches.   Hematological: Bruises/bleeds easily (bruise).   Psychiatric/Behavioral: Positive for sleep disturbance (difficulty falling/ staying asleep- SoA at HS).       Objective   Vitals:    02/09/21 0900   BP: 141/80   BP Location: Left arm   Patient Position: Sitting   Pulse: 72   SpO2: 97%   Weight: 59.9 kg (132 lb)   Height: 157.5 cm (62.01\")      /80 (BP Location: Left arm, Patient Position: Sitting)   Pulse 72   Ht 157.5 cm (62.01\")   Wt 59.9 kg (132 lb)   SpO2 97%   BMI 24.14 kg/m²     Lab Results (most recent)     None          Physical Exam  Vitals signs and nursing note reviewed.   Constitutional:       General: " She is not in acute distress.     Appearance: Normal appearance. She is well-developed.   HENT:      Head: Normocephalic and atraumatic.   Eyes:      General: No scleral icterus.        Right eye: No discharge.         Left eye: No discharge.      Conjunctiva/sclera: Conjunctivae normal.   Neck:      Vascular: No carotid bruit.   Cardiovascular:      Rate and Rhythm: Normal rate and regular rhythm.      Heart sounds: Murmur present. Systolic murmur present with a grade of 1/6. No friction rub. No gallop.    Pulmonary:      Effort: Pulmonary effort is normal. No respiratory distress.      Breath sounds: Normal breath sounds. No wheezing or rales.   Chest:      Chest wall: No tenderness.   Musculoskeletal:      Right lower leg: No edema.      Left lower leg: No edema.   Skin:     General: Skin is warm and dry.      Coloration: Skin is not pale.      Findings: No erythema or rash.   Neurological:      Mental Status: She is alert and oriented to person, place, and time.      Cranial Nerves: No cranial nerve deficit.   Psychiatric:         Behavior: Behavior normal.         Procedure   Procedures       Assessment/Plan     Problems Addressed this Visit        Cardiac and Vasculature    Palpitations    Chest pain - Primary       Pulmonary and Pneumonias    Shortness of breath      Diagnoses       Codes Comments    Chest pain, unspecified type    -  Primary ICD-10-CM: R07.9  ICD-9-CM: 786.50     Shortness of breath     ICD-10-CM: R06.02  ICD-9-CM: 786.05     Palpitations     ICD-10-CM: R00.2  ICD-9-CM: 785.1           Recommendation  1.  Patient is an 85-year-old female that presents back to the office for follow-up.  There has been issues in regards to noncompliance.  She does not seem to want any evaluation of her rhythm although I tried to  her on the possibility of recurrent CVAs as she may have atrial fibrillation or flutter.  Her history of SVT is concerning.  Ultimately, placing someone on anticoagulation  without that documented history, would be at risk as well.  We then discussed loop recorder today.  Patient does not seem interested despite me instructing that she could have another CVA.    2.  She he is agreeable to undergo stress test for evaluation.  We will perform chemical testing because of stroke just to rule out ischemia as a contributing factor of her dyspnea and chest pain.  She has nitroglycerin prescribed as needed for chest discomfort.    3.  Otherwise she is stable.  I am placing her on low-dose beta-blockade because she does have a history of SVT.  She occasionally will feel a palpitation.  We will see her back for follow-up after testing.  She should contact emergency medical services for any repeat CVA-like symptoms.  She is to follow with primary as scheduled           Radha Jung  reports that she has quit smoking. Her smoking use included cigarettes. She has never used smokeless tobacco.        Patient's Body mass index is 24.14 kg/m². BMI is within normal parameters. No follow-up required.       Advance Care Planning   ACP discussion was held with the patient during this visit. Patient has an advance directive (not in EMR), copy requested.    Electronically signed by:

## 2021-02-18 ENCOUNTER — APPOINTMENT (OUTPATIENT)
Dept: CARDIOLOGY | Facility: HOSPITAL | Age: 86
End: 2021-02-18

## 2021-03-29 ENCOUNTER — HOSPITAL ENCOUNTER (OUTPATIENT)
Dept: CARDIOLOGY | Facility: HOSPITAL | Age: 86
Discharge: HOME | End: 2021-03-29

## 2021-03-29 DIAGNOSIS — R07.9 CHEST PAIN, UNSPECIFIED TYPE: ICD-10-CM

## 2021-03-29 DIAGNOSIS — R00.2 PALPITATIONS: ICD-10-CM

## 2021-03-29 DIAGNOSIS — R06.02 SHORTNESS OF BREATH: ICD-10-CM

## 2021-03-29 DIAGNOSIS — I63.9 CEREBROVASCULAR ACCIDENT (CVA), UNSPECIFIED MECHANISM (HCC): ICD-10-CM

## 2021-03-29 PROCEDURE — 0 TECHNETIUM SESTAMIBI: Performed by: INTERNAL MEDICINE

## 2021-03-29 PROCEDURE — 78452 HT MUSCLE IMAGE SPECT MULT: CPT

## 2021-03-29 PROCEDURE — 93016 CV STRESS TEST SUPVJ ONLY: CPT | Performed by: NURSE PRACTITIONER

## 2021-03-29 PROCEDURE — A9500 TC99M SESTAMIBI: HCPCS | Performed by: INTERNAL MEDICINE

## 2021-03-29 PROCEDURE — 93017 CV STRESS TEST TRACING ONLY: CPT

## 2021-03-29 PROCEDURE — 93018 CV STRESS TEST I&R ONLY: CPT | Performed by: INTERNAL MEDICINE

## 2021-03-29 PROCEDURE — 25010000002 REGADENOSON 0.4 MG/5ML SOLUTION: Performed by: INTERNAL MEDICINE

## 2021-03-29 PROCEDURE — 78452 HT MUSCLE IMAGE SPECT MULT: CPT | Performed by: INTERNAL MEDICINE

## 2021-03-29 RX ADMIN — REGADENOSON 0.4 MG: 0.08 INJECTION, SOLUTION INTRAVENOUS at 13:29

## 2021-03-29 RX ADMIN — TECHNETIUM TC 99M SESTAMIBI 1 DOSE: 1 INJECTION INTRAVENOUS at 11:47

## 2021-03-29 RX ADMIN — TECHNETIUM TC 99M SESTAMIBI 1 DOSE: 1 INJECTION INTRAVENOUS at 13:28

## 2021-03-30 LAB
BH CV NUCLEAR PRIOR STUDY: 3
BH CV REST NUCLEAR ISOTOPE DOSE: 10 MCI
BH CV STRESS BP STAGE 1: NORMAL
BH CV STRESS COMMENTS STAGE 1: NORMAL
BH CV STRESS DOSE REGADENOSON STAGE 1: 0.4
BH CV STRESS DURATION MIN STAGE 1: 0
BH CV STRESS DURATION SEC STAGE 1: 10
BH CV STRESS HR STAGE 1: 77
BH CV STRESS NUCLEAR ISOTOPE DOSE: 30 MCI
BH CV STRESS PROTOCOL 1: NORMAL
BH CV STRESS RECOVERY BP: NORMAL MMHG
BH CV STRESS RECOVERY HR: 74 BPM
BH CV STRESS STAGE 1: 1
MAXIMAL PREDICTED HEART RATE: 137 BPM
PERCENT MAX PREDICTED HR: 56.2 %
STRESS BASELINE BP: NORMAL MMHG
STRESS BASELINE HR: 65 BPM
STRESS PERCENT HR: 66 %
STRESS POST PEAK BP: NORMAL MMHG
STRESS POST PEAK HR: 77 BPM
STRESS TARGET HR: 116 BPM

## 2021-04-12 ENCOUNTER — OFFICE VISIT (OUTPATIENT)
Dept: CARDIOLOGY | Facility: CLINIC | Age: 86
End: 2021-04-12

## 2021-04-12 VITALS
OXYGEN SATURATION: 96 % | SYSTOLIC BLOOD PRESSURE: 150 MMHG | DIASTOLIC BLOOD PRESSURE: 80 MMHG | HEIGHT: 62 IN | WEIGHT: 133.2 LBS | HEART RATE: 61 BPM | BODY MASS INDEX: 24.51 KG/M2

## 2021-04-12 DIAGNOSIS — R06.02 SHORTNESS OF BREATH: ICD-10-CM

## 2021-04-12 DIAGNOSIS — I63.9 CEREBROVASCULAR ACCIDENT (CVA), UNSPECIFIED MECHANISM (HCC): Primary | ICD-10-CM

## 2021-04-12 DIAGNOSIS — I10 ESSENTIAL HYPERTENSION: ICD-10-CM

## 2021-04-12 PROCEDURE — 99213 OFFICE O/P EST LOW 20 MIN: CPT | Performed by: PHYSICIAN ASSISTANT

## 2021-04-12 NOTE — PROGRESS NOTES
"Problem list     Subjective   Radha Jung is a 85 y.o. female     Chief Complaint   Patient presents with   • Follow-up   Problem list  1.  CVA  1.1 left-sided CVA diagnosed November 2020.  1.1 carotid duplex showing nonobstructive disease bilaterally November 2020  1.2 echocardiogram not demonstrating any evidence of septal defect or evidence of mural thrombus  2.  Preserved systolic function  3.  Stress test 2018 showing no evidence of ischemia preserved LV function  4.  Fatigue  5.  Palpitations  5.1 Holter monitor 2016 demonstrated PVCs, PACs and short burst of SVT  6.  Stress test March 2021 suggest inferolateral ischemia preserved LV function     HPI     Patient is an 85-year-old female that presents to the office for evaluation.  We initially saw this patient in the office after she had a cerebrovascular accident.  She did not go to the hospital for CVA.  Please see prior notes but apparently woke up on Sunday with left-sided weakness.  She ended up going to the chiropractor the next day and eventually made it to our office.  She had work-up scheduled.  We stayed in touch with primary and apparently scan showed a significant CVA.  Patient has since been seen by [facility].  Patient was not on aspirin and statin prior to her CVA and has been placed on those medications.  Event monitor scheduled with patient could not wear it apparently there was some issues with wearing the monitor.  According to the , she made it clear she does not want to do that.    Last office visit we discussed concerns with patient regards to the need for telemetry monitoring but again they acknowledge the risk but refused    She is here to follow-up on a stress test demonstrating a mild to moderate defect in the inferolateral wall.    She feels well.  She has no chest pain or pressure except she had fell recently trying to go up the steps.  She had apparently \"cracked\" rib she has discomfort on that side of her " chest related to that fall but she does not describe any other type of discomfort.  She actually describes her dyspnea has improved.  She does not describe PND orthopnea.    She does not describe palpitating or having any recurrent cerebral Bolick events.  Otherwise she appears stable          Current Outpatient Medications on File Prior to Visit   Medication Sig Dispense Refill   • aspirin (aspirin) 81 MG EC tablet Take 1 tablet by mouth Daily. 30 tablet 5   • atorvastatin (LIPITOR) 40 MG tablet Take 1 tablet by mouth Daily. 30 tablet 11   • azithromycin (ZITHROMAX) 250 MG tablet Take 2 tablets the first day, then 1 tablet daily for 4 days. 6 tablet 0   • CloNIDine (CATAPRES) 0.1 MG tablet Take TID PRN for BP higher than top #160, bottom # 90 30 tablet 5   • clopidogrel (PLAVIX) 75 MG tablet Take 75 mg by mouth Daily.     • furosemide (LASIX) 20 MG tablet Take 1 tablet by mouth Daily As Needed (For 2 lb weight gain overnight and/or swelling in legs). 30 tablet 11   • guaiFENesin (MUCINEX) 600 MG 12 hr tablet Take 1,200 mg by mouth 2 (Two) Times a Day.     • lisinopril (PRINIVIL,ZESTRIL) 10 MG tablet Take 1 tablet by mouth 2 (two) times a day. 60 tablet 5   • metoprolol succinate XL (TOPROL-XL) 25 MG 24 hr tablet Take 1 tablet by mouth Daily. 30 tablet 11   • nitroglycerin (NITROSTAT) 0.4 MG SL tablet Place 0.4 mg under the tongue Every 5 (Five) Minutes As Needed for Chest Pain.       No current facility-administered medications on file prior to visit.       Patient has no known allergies.    Past Medical History:   Diagnosis Date   • Asthma    • Breast cancer (CMS/HCC)    • Colon cancer (CMS/HCC)    • Hyperlipidemia    • TIA (transient ischemic attack)    • TIA (transient ischemic attack)        Social History     Socioeconomic History   • Marital status:      Spouse name: Not on file   • Number of children: Not on file   • Years of education: Not on file   • Highest education level: Not on file   Tobacco  "Use   • Smoking status: Former Smoker     Types: Cigarettes   • Smokeless tobacco: Never Used   • Tobacco comment: -EL 2/12/19    Substance and Sexual Activity   • Alcohol use: Yes     Comment: Social   • Drug use: No   • Sexual activity: Defer       Family History   Problem Relation Age of Onset   • No Known Problems Mother    • Asthma Father        Review of Systems   Constitutional: Negative for chills, fatigue and fever.   HENT: Negative.  Negative for congestion, rhinorrhea and sore throat.    Eyes: Positive for visual disturbance (reading glasses).   Respiratory: Positive for shortness of breath. Negative for apnea and chest tightness.    Cardiovascular: Positive for chest pain (L sided chest pain- fell and cracked 2 ribs) and palpitations. Negative for leg swelling.   Gastrointestinal: Negative.    Endocrine: Negative.    Genitourinary: Negative.    Musculoskeletal: Positive for arthralgias, back pain, gait problem (frequent falls) and neck pain. Negative for neck stiffness.   Skin: Negative.  Negative for rash and wound.   Allergic/Immunologic: Negative for environmental allergies and food allergies.   Neurological: Positive for weakness and numbness (L arm/leg, R hand). Negative for dizziness, light-headedness and headaches.   Hematological: Bruises/bleeds easily.   Psychiatric/Behavioral: Positive for sleep disturbance (denies SoA at HS).       Objective   Vitals:    04/12/21 1026   BP: 150/80   BP Location: Left arm   Patient Position: Sitting   Pulse: 61   SpO2: 96%   Weight: 60.4 kg (133 lb 3.2 oz)   Height: 157.5 cm (62.01\")      /80 (BP Location: Left arm, Patient Position: Sitting)   Pulse 61   Ht 157.5 cm (62.01\")   Wt 60.4 kg (133 lb 3.2 oz)   SpO2 96%   BMI 24.36 kg/m²     Lab Results (most recent)     None          Physical Exam  Vitals and nursing note reviewed.   Constitutional:       General: She is not in acute distress.     Appearance: Normal appearance. She is well-developed.   "   HENT:      Head: Normocephalic and atraumatic.   Eyes:      General: No scleral icterus.        Right eye: No discharge.         Left eye: No discharge.      Conjunctiva/sclera: Conjunctivae normal.   Neck:      Vascular: No carotid bruit.   Cardiovascular:      Rate and Rhythm: Normal rate and regular rhythm.      Heart sounds: Normal heart sounds. No murmur heard.   No friction rub. No gallop.    Pulmonary:      Effort: Pulmonary effort is normal. No respiratory distress.      Breath sounds: Normal breath sounds. No wheezing or rales.   Chest:      Chest wall: No tenderness.   Musculoskeletal:      Right lower leg: No edema.      Left lower leg: No edema.   Skin:     General: Skin is warm and dry.      Coloration: Skin is not pale.      Findings: No erythema or rash.   Neurological:      Mental Status: She is alert and oriented to person, place, and time.      Cranial Nerves: No cranial nerve deficit.   Psychiatric:         Behavior: Behavior normal.         Procedure   Procedures       Assessment/Plan     Problems Addressed this Visit        Cardiac and Vasculature    Essential hypertension       Neuro    Cerebrovascular accident (CVA) (CMS/[facility]) - Primary       Pulmonary and Pneumonias    Shortness of breath      Diagnoses       Codes Comments    Cerebrovascular accident (CVA), unspecified mechanism (CMS/[facility])    -  Primary ICD-10-CM: I63.9  ICD-9-CM: 434.91     Shortness of breath     ICD-10-CM: R06.02  ICD-9-CM: 786.05     Essential hypertension     ICD-10-CM: I10  ICD-9-CM: 401.9           Recommendation  1.  Patient with previous CVA followed by neurology in [place].  Patient is continued on dual antiplatelet and statin therapy.  She refuses any further evaluation for A. fib please see prior notes from that standpoint    2.  Patient with mild levels of dyspnea but stable.  In regards to stress test, she does not describe any ischemic symptoms.  She had chest pain from recent fall.  Apparently this was  mechanical according to the .  She was trying to carry something and walk up the steps.  For now they would like to monitor and do not want any further invasive evaluation    3.  Blood pressure slightly elevated but better than it has been in the past especially in January of this year.  For now we will continue the same and see her back for follow-up in 6 months or sooner as symptoms discussed.  She is to follow with primary as scheduled           Radha Jung  reports that she has quit smoking. Her smoking use included cigarettes. She has never used smokeless tobacco.         Patient's Body mass index is 24.36 kg/m². BMI is within normal parameters. No follow-up required..       Advance Care Planning   ACP discussion was held with the patient during this visit. Patient has an advance directive (not in EMR), copy requested.    Electronically signed by:

## 2021-04-12 NOTE — PATIENT INSTRUCTIONS

## 2021-06-08 RX ORDER — LISINOPRIL 10 MG/1
TABLET ORAL
Qty: 60 TABLET | Refills: 5 | Status: SHIPPED | OUTPATIENT
Start: 2021-06-08 | End: 2021-12-06

## 2021-10-04 RX ORDER — ATORVASTATIN CALCIUM 40 MG/1
TABLET, FILM COATED ORAL
Qty: 30 TABLET | Refills: 11 | Status: SHIPPED | OUTPATIENT
Start: 2021-10-04

## 2021-12-06 RX ORDER — LISINOPRIL 10 MG/1
TABLET ORAL
Qty: 60 TABLET | Refills: 5 | Status: SHIPPED | OUTPATIENT
Start: 2021-12-06 | End: 2022-08-18 | Stop reason: SDUPTHER

## 2022-01-04 DIAGNOSIS — R07.9 CHEST PAIN, UNSPECIFIED TYPE: ICD-10-CM

## 2022-01-04 DIAGNOSIS — R00.2 PALPITATIONS: Primary | ICD-10-CM

## 2022-01-04 RX ORDER — METOPROLOL SUCCINATE 25 MG/1
TABLET, EXTENDED RELEASE ORAL
Qty: 30 TABLET | Refills: 5 | Status: SHIPPED | OUTPATIENT
Start: 2022-01-04 | End: 2022-08-18 | Stop reason: SDUPTHER

## 2022-02-01 RX ORDER — CLOPIDOGREL BISULFATE 75 MG/1
75 TABLET ORAL DAILY
Qty: 30 TABLET | Refills: 1 | Status: SHIPPED | OUTPATIENT
Start: 2022-02-01 | End: 2022-02-28

## 2022-02-15 NOTE — PROGRESS NOTES
Problem list     Subjective   Radha Jung is a 85 y.o. female     Chief Complaint   Patient presents with   • Follow-up   • Chest Pain   Problem list  1.  CVA  1.1 left-sided CVA diagnosed November 2020.  1.1 carotid duplex showing nonobstructive disease bilaterally November 2020  1.2 echocardiogram not demonstrating any evidence of septal defect or evidence of mural thrombus  2.  Preserved systolic function  3.  Stress test 2018 showing no evidence of ischemia preserved LV function  4.  Fatigue  5.  Palpitations  5.1 Holter monitor 2016 demonstrated PVCs, PACs and short burst of SVT    HPI    Patient is an 85-year-old female that presents to the office for evaluation.  We initially saw this patient in the office after she had a cerebrovascular accident.  She did not go to the hospital for CVA.  Please see prior notes but apparently woke up on Sunday with left-sided weakness.  She ended up going to the chiropractor the next day and eventually made it to our office.  She had work-up scheduled.  We stayed in touch with primary and apparently scan showed a significant CVA.  Patient has since been seen by Dr. Rick of [place].  Patient was not on aspirin and statin prior to her CVA and has been placed on those medications.  Event monitor scheduled with patient could not wear it apparently there was some issues with wearing the monitor.  According to the , she made it clear she does not want to do that.    Patient has been experiencing chest discomfort on the left side of the chest.  This occurs and is tender to palpation.  This occurs at random but it has been significant.   describes to me that she will call in shortness of breath and have discomfort on the left side of the chest.  She does not describe PND orthopnea.    She has had palpitations in the past.  She does not describe any significant palpitations does not reveal.  No dizziness presyncope or syncope.  Otherwise patient is doing  well    Current Outpatient Medications on File Prior to Visit   Medication Sig Dispense Refill   • aspirin (aspirin) 81 MG EC tablet Take 1 tablet by mouth Daily. 30 tablet 5   • atorvastatin (LIPITOR) 40 MG tablet Take 1 tablet by mouth Daily. 30 tablet 11   • CloNIDine (CATAPRES) 0.1 MG tablet Take TID PRN for BP higher than top #160, bottom # 90 30 tablet 5   • furosemide (LASIX) 20 MG tablet Take 1 tablet by mouth Daily As Needed (For 2 lb weight gain overnight and/or swelling in legs). 30 tablet 11   • azithromycin (ZITHROMAX) 250 MG tablet Take 2 tablets the first day, then 1 tablet daily for 4 days. 6 tablet 0   • guaiFENesin (MUCINEX) 600 MG 12 hr tablet Take 1,200 mg by mouth 2 (Two) Times a Day.     • nitroglycerin (NITROSTAT) 0.4 MG SL tablet Place 0.4 mg under the tongue Every 5 (Five) Minutes As Needed for Chest Pain.     • [DISCONTINUED] lisinopril (PRINIVIL,ZESTRIL) 10 MG tablet Take 1 tablet by mouth Daily. 60 tablet 5     No current facility-administered medications on file prior to visit.        Patient has no known allergies.    Past Medical History:   Diagnosis Date   • Asthma    • Breast cancer (CMS/HCC)    • Colon cancer (CMS/HCC)    • Hyperlipidemia    • TIA (transient ischemic attack)    • TIA (transient ischemic attack)        Social History     Socioeconomic History   • Marital status:      Spouse name: Not on file   • Number of children: Not on file   • Years of education: Not on file   • Highest education level: Not on file   Tobacco Use   • Smoking status: Former Smoker     Types: Cigarettes   • Smokeless tobacco: Never Used   • Tobacco comment: -EL 2/12/19    Substance and Sexual Activity   • Alcohol use: Yes     Comment: Social   • Drug use: No   • Sexual activity: Defer       Family History   Problem Relation Age of Onset   • No Known Problems Mother    • Asthma Father        Review of Systems   Constitutional: Negative for chills, fatigue and fever.   HENT: Negative for  "congestion, rhinorrhea and sore throat.    Eyes: Positive for visual disturbance (glasses).   Respiratory: Positive for chest tightness and shortness of breath. Negative for apnea.    Cardiovascular: Positive for chest pain (on left side of chest- states when you touch a certain spot) and palpitations. Negative for leg swelling.   Endocrine: Negative.  Negative for cold intolerance and heat intolerance.   Musculoskeletal: Positive for arthralgias, back pain and gait problem (unsteady on feet). Negative for neck pain and neck stiffness.   Skin: Negative.  Negative for rash and wound.   Allergic/Immunologic: Negative for environmental allergies and food allergies.   Neurological: Positive for weakness and headaches. Negative for dizziness, light-headedness and numbness.   Hematological: Bruises/bleeds easily (bruise).   Psychiatric/Behavioral: Positive for sleep disturbance (difficulty falling and staying asleep).       Objective   Vitals:    01/18/21 1321   BP: 169/91   BP Location: Right arm   Patient Position: Sitting   Pulse: 79   SpO2: 98%   Weight: 59.6 kg (131 lb 6.4 oz)   Height: 157.5 cm (62.01\")      /91 (BP Location: Right arm, Patient Position: Sitting)   Pulse 79   Ht 157.5 cm (62.01\")   Wt 59.6 kg (131 lb 6.4 oz)   SpO2 98%   BMI 24.03 kg/m²     Lab Results (most recent)     None          Physical Exam  Vitals signs and nursing note reviewed.   Constitutional:       General: She is not in acute distress.     Appearance: Normal appearance. She is well-developed.   HENT:      Head: Normocephalic and atraumatic.   Eyes:      General: No scleral icterus.        Right eye: No discharge.         Left eye: No discharge.      Conjunctiva/sclera: Conjunctivae normal.   Neck:      Vascular: No carotid bruit.   Cardiovascular:      Rate and Rhythm: Normal rate and regular rhythm.      Heart sounds: Normal heart sounds. No murmur. No friction rub. No gallop.    Pulmonary:      Effort: Pulmonary effort is " normal. No respiratory distress.      Breath sounds: Normal breath sounds. No wheezing or rales.   Chest:      Chest wall: No tenderness.   Musculoskeletal:      Right lower leg: No edema.      Left lower leg: No edema.   Skin:     General: Skin is warm and dry.      Coloration: Skin is not pale.      Findings: No erythema or rash.   Neurological:      Mental Status: She is alert and oriented to person, place, and time.      Cranial Nerves: No cranial nerve deficit.   Psychiatric:         Behavior: Behavior normal.         Procedure   Procedures       Assessment/Plan     Problems Addressed this Visit        Other    Shortness of breath    Relevant Orders    Stress Test With Myocardial Perfusion One Day    Palpitations    Relevant Orders    Stress Test With Myocardial Perfusion One Day    Chest pain    Relevant Orders    Stress Test With Myocardial Perfusion One Day    Cerebrovascular accident (CVA) (CMS/[facility]) - Primary    Relevant Orders    Stress Test With Myocardial Perfusion One Day      Diagnoses       Codes Comments    Cerebrovascular accident (CVA), unspecified mechanism (CMS/[facility])    -  Primary ICD-10-CM: I63.9  ICD-9-CM: 434.91     Palpitations     ICD-10-CM: R00.2  ICD-9-CM: 785.1     Chest pain, unspecified type     ICD-10-CM: R07.9  ICD-9-CM: 786.50     Shortness of breath     ICD-10-CM: R06.02  ICD-9-CM: 786.05           Recommendation  1.  Patient with history of CVA.  It is cryptogenic at this standpoint but Holter monitor in the past showed significant runs of SVT.  I would be very concerned for possible atrial fibrillation or atrial flutter.  We have discussed our recommendations with the patient.  We want to rule this out but she refuses evaluation to rule out atrial fibrillation or flutter.  She acknowledges our recommendation and CVA risk has been discussed.  We will abide by their wishes at this time.    2.  Ultimately, she was not on aspirin and statin prior to her CVA and I recommend continuing  it.  They are concerned about chest discomfort and I will order a stress test to rule out ischemia although I would be suspicious of musculoskeletal causes based on her description.    3.  I am placing her on antihypertensive therapy because of her hypertension noted today and last office visit.  I will try to obtain records from her neurologist as well as his PCP.    4.  Otherwise we will see her back for follow-up on testing.  She is to follow with primary as scheduled           Radha Jung  reports that she has quit smoking. Her smoking use included cigarettes. She has never used smokeless tobacco.        Patient's Body mass index is 24.03 kg/m². BMI is within normal parameters. No follow-up required.       Advance Care Planning   ACP discussion was held with the patient during this visit. Patient has an advance directive (not in EMR), copy requested.       Electronically signed by:     Full Thickness Lip Wedge Repair (Flap) Text: Given the location of the defect and the proximity to free margins a full thickness wedge repair was deemed most appropriate.  Using a sterile surgical marker, the appropriate repair was drawn incorporating the defect and placing the expected incisions perpendicular to the vermilion border.  The vermilion border was also meticulously outlined to ensure appropriate reapproximation during the repair.  The area thus outlined was incised through and through with a #15 scalpel blade.  The muscularis and dermis were reaproximated with deep sutures following hemostasis. Care was taken to realign the vermilion border before proceeding with the superficial closure.  Once the vermilion was realigned the superfical and mucosal closure was finished.

## 2022-02-28 DIAGNOSIS — I63.9 CEREBROVASCULAR ACCIDENT (CVA), UNSPECIFIED MECHANISM: Primary | ICD-10-CM

## 2022-02-28 DIAGNOSIS — G45.9 TIA (TRANSIENT ISCHEMIC ATTACK): ICD-10-CM

## 2022-02-28 RX ORDER — CLOPIDOGREL BISULFATE 75 MG/1
TABLET ORAL
Qty: 30 TABLET | Refills: 1 | Status: SHIPPED | OUTPATIENT
Start: 2022-02-28 | End: 2022-05-02

## 2022-04-29 DIAGNOSIS — I63.9 CEREBROVASCULAR ACCIDENT (CVA), UNSPECIFIED MECHANISM: ICD-10-CM

## 2022-04-29 DIAGNOSIS — G45.9 TIA (TRANSIENT ISCHEMIC ATTACK): ICD-10-CM

## 2022-05-02 RX ORDER — CLOPIDOGREL BISULFATE 75 MG/1
TABLET ORAL
Qty: 30 TABLET | Refills: 0 | Status: SHIPPED | OUTPATIENT
Start: 2022-05-02 | End: 2022-08-18 | Stop reason: SDUPTHER

## 2022-05-04 ENCOUNTER — TELEPHONE (OUTPATIENT)
Dept: CARDIOLOGY | Facility: CLINIC | Age: 87
End: 2022-05-04

## 2022-05-05 NOTE — TELEPHONE ENCOUNTER
The PM routed the telephone encounter regarding pt refusing to schedule an appt for her cardiac clearance visit.  Nabila Pena' office notified of the refusal and instructed her to call our office and we will work the pt in to avoid delaying her procedure if she can convince the pt to come in for a clearance appt.

## 2022-06-13 RX ORDER — LISINOPRIL 10 MG/1
TABLET ORAL
Qty: 60 TABLET | Refills: 5 | OUTPATIENT
Start: 2022-06-13

## 2022-07-08 DIAGNOSIS — R07.9 CHEST PAIN, UNSPECIFIED TYPE: ICD-10-CM

## 2022-07-08 DIAGNOSIS — R00.2 PALPITATIONS: ICD-10-CM

## 2022-07-11 RX ORDER — LISINOPRIL 10 MG/1
TABLET ORAL
Qty: 60 TABLET | Refills: 5 | OUTPATIENT
Start: 2022-07-11

## 2022-07-11 RX ORDER — METOPROLOL SUCCINATE 25 MG/1
TABLET, EXTENDED RELEASE ORAL
Qty: 30 TABLET | Refills: 5 | OUTPATIENT
Start: 2022-07-11

## 2022-07-14 RX ORDER — LISINOPRIL 10 MG/1
TABLET ORAL
Qty: 60 TABLET | Refills: 5 | OUTPATIENT
Start: 2022-07-14

## 2022-08-17 ENCOUNTER — OFFICE VISIT (OUTPATIENT)
Dept: CARDIOLOGY | Facility: CLINIC | Age: 87
End: 2022-08-17

## 2022-08-17 VITALS
SYSTOLIC BLOOD PRESSURE: 151 MMHG | OXYGEN SATURATION: 98 % | BODY MASS INDEX: 22.02 KG/M2 | HEIGHT: 64 IN | WEIGHT: 129 LBS | HEART RATE: 76 BPM | DIASTOLIC BLOOD PRESSURE: 76 MMHG

## 2022-08-17 DIAGNOSIS — G45.9 TIA (TRANSIENT ISCHEMIC ATTACK): ICD-10-CM

## 2022-08-17 DIAGNOSIS — I63.9 CEREBROVASCULAR ACCIDENT (CVA), UNSPECIFIED MECHANISM: ICD-10-CM

## 2022-08-17 DIAGNOSIS — I10 ESSENTIAL HYPERTENSION: ICD-10-CM

## 2022-08-17 DIAGNOSIS — R55 SYNCOPE AND COLLAPSE: Primary | ICD-10-CM

## 2022-08-17 PROCEDURE — 99214 OFFICE O/P EST MOD 30 MIN: CPT | Performed by: PHYSICIAN ASSISTANT

## 2022-08-17 RX ORDER — AMOXICILLIN AND CLAVULANATE POTASSIUM 875; 125 MG/1; MG/1
TABLET, FILM COATED ORAL
COMMUNITY
Start: 2022-08-10 | End: 2022-08-17 | Stop reason: ALTCHOICE

## 2022-08-17 NOTE — PROGRESS NOTES
Problem list     Subjective   Radha Jung is a 86 y.o. female     Chief Complaint   Patient presents with   • [facility] Follow up   • Syncope   Problem list  1.  CVA  1.1 left-sided CVA diagnosed November 2020.  1.1 carotid duplex showing nonobstructive disease bilaterally November 2020  1.2 echocardiogram not demonstrating any evidence of septal defect or evidence of mural thrombus  2.  Preserved systolic function  3.  Stress test 2018 showing no evidence of ischemia preserved LV function  4.  Fatigue  5.  Palpitations  5.1 Holter monitor 2016 demonstrated PVCs, PACs and short burst of SVT  6.  Stress test March 2021 suggest inferolateral ischemia preserved LV function           HPI    Patient is a 85-year-old female who presents back to the office for evaluation.  As above, patient has history of CVA.  Patient has been evaluated by neurology in [place].  Patient has had work-up performed.  She did not wear event monitor and did not appear to want to wear event monitor.    Recently, patient had an issue in which she possibly had a syncopal episode.  She apparently was going up the stairs and according to the patient, she cannot remember what happened.  She apparently fell.  Her  is not sure if she just missed a step or if she actually passed out patient does not seem to be sure.  She went to the emergency room and had work-up performed including scans to rule out any intracranial issue or fractures    She presents back.  She has no chest pain or pressure.  She has not had any recurrent events.  She can experience mild dyspnea but does not describe progressive or severe exertional dyspnea.  No complaints of PND orthopnea.    She does palpitate at times.  She does not describe any other syncopal episodes.  She is stable otherwise        Current Outpatient Medications on File Prior to Visit   Medication Sig Dispense Refill   • aspirin (aspirin) 81 MG EC tablet Take 1 tablet by mouth Daily. 30 tablet 5   •  atorvastatin (LIPITOR) 40 MG tablet TAKE ONE TABLET BY MOUTH EVERY DAY 30 tablet 11   • nitroglycerin (NITROSTAT) 0.4 MG SL tablet Place 0.4 mg under the tongue Every 5 (Five) Minutes As Needed for Chest Pain.     • clopidogrel (PLAVIX) 75 MG tablet TAKE ONE TABLET BY MOUTH EVERY DAY 30 tablet 0   • furosemide (LASIX) 20 MG tablet Take 1 tablet by mouth Daily As Needed (For 2 lb weight gain overnight and/or swelling in legs). 30 tablet 11   • lisinopril (PRINIVIL,ZESTRIL) 10 MG tablet TAKE ONE TABLET BY MOUTH TWICE DAILY 60 tablet 5   • metoprolol succinate XL (TOPROL-XL) 25 MG 24 hr tablet TAKE ONE TABLET BY MOUTH EVERY DAY 30 tablet 5     No current facility-administered medications on file prior to visit.       Patient has no known allergies.    Past Medical History:   Diagnosis Date   • Asthma    • Breast cancer (HCC)    • Colon cancer (HCC)    • Hyperlipidemia    • TIA (transient ischemic attack)    • TIA (transient ischemic attack)        Social History     Socioeconomic History   • Marital status:    Tobacco Use   • Smoking status: Former Smoker     Types: Cigarettes   • Smokeless tobacco: Never Used   • Tobacco comment: -EL 2/12/19    Substance and Sexual Activity   • Alcohol use: Yes     Comment: Social   • Drug use: No   • Sexual activity: Defer       Family History   Problem Relation Age of Onset   • No Known Problems Mother    • Asthma Father        Review of Systems   Constitutional: Negative for chills and fever.   HENT: Negative.  Negative for congestion and sinus pressure.    Respiratory: Positive for shortness of breath. Negative for chest tightness.    Cardiovascular: Negative for chest pain and leg swelling.   Musculoskeletal: Positive for gait problem (falling).   Neurological: Positive for syncope.   Hematological: Bruises/bleeds easily.   Psychiatric/Behavioral: Negative.  Negative for sleep disturbance (denies waking with soa or cp).       Objective   Vitals:    08/17/22 1333   BP:  "151/76   BP Location: Left arm   Patient Position: Sitting   Pulse: 76   SpO2: 98%   Weight: 58.5 kg (129 lb)   Height: 162.6 cm (64\")      /76 (BP Location: Left arm, Patient Position: Sitting)   Pulse 76   Ht 162.6 cm (64\")   Wt 58.5 kg (129 lb)   SpO2 98%   BMI 22.14 kg/m²     Lab Results (most recent)     None          Physical Exam  Vitals and nursing note reviewed.   Constitutional:       General: She is not in acute distress.     Appearance: Normal appearance. She is well-developed.   HENT:      Head: Normocephalic and atraumatic.   Eyes:      General: No scleral icterus.        Right eye: No discharge.         Left eye: No discharge.      Conjunctiva/sclera: Conjunctivae normal.   Neck:      Vascular: No carotid bruit.   Cardiovascular:      Rate and Rhythm: Normal rate and regular rhythm.      Heart sounds: Normal heart sounds. No murmur heard.    No friction rub. No gallop.   Pulmonary:      Effort: Pulmonary effort is normal. No respiratory distress.      Breath sounds: Normal breath sounds. No wheezing or rales.   Chest:      Chest wall: No tenderness.   Musculoskeletal:      Right lower leg: No edema.      Left lower leg: No edema.   Skin:     General: Skin is warm and dry.      Coloration: Skin is not pale.      Findings: No erythema or rash.   Neurological:      Mental Status: She is alert and oriented to person, place, and time.      Cranial Nerves: No cranial nerve deficit.   Psychiatric:         Behavior: Behavior normal.         Procedure   Procedures       Assessment & Plan     Problems Addressed this Visit        Cardiac and Vasculature    Essential hypertension       Neuro    Cerebrovascular accident (CVA) ([facility])       Symptoms and Signs    Syncope and collapse - Primary      Diagnoses       Codes Comments    Syncope and collapse    -  Primary ICD-10-CM: R55  ICD-9-CM: 780.2     Essential hypertension     ICD-10-CM: I10  ICD-9-CM: 401.9     Cerebrovascular accident (CVA), unspecified " mechanism (HCC)     ICD-10-CM: I63.9  ICD-9-CM: 434.91         Recommendation  1.  We are very familiar with this patient having seen her for quite some time and she had history of CVA but did not complete the full work-up to exclude a possible embolic event.  We wanted her to consider some type of telemetry monitoring such as an event monitor but they refused. Despite the risk of having another embolic event, they refuse.   Today, we discussed her syncope and work-up to be done to evaluate.  However, every suggestion that I had in the examination room, they seem to argue and did not want to do any testing.  In fact, patient needs to be on antiplatelet therapy with statin therapy but apparently she is only taking statin therapy despite the fact that we want her to take all of her medications appropriately.  It is very difficult to treat this patient as they seem to only want to do what they think is best despite our  or recommendation.  Therefore, we will defer to them at this time.  They are aware of our recommendations.  We discussed the abnormal stress test that she had in the past but she currently does not have any chest pain.  However, she does have shortness of breath.  Catheterization or cardiac CTA could be helpful to exclude the possibility of obstructive coronary disease.  They do not want any testing at this time.    2.  Patient's blood pressure is slightly elevated at this time.  We can continue to monitor through the office    3.  Otherwise, with patient's history of CVA, would recommend her taking antiplatelet therapy with statin therapy.  Ultimately, I do feel that she needs to consider some type of loop recorder or event monitor.  However, I have not been successful in convincing him to perform any evaluation. SHe would like to continue as she is for now.  We will defer to neurology and primary care at this time.               BMI is within normal parameters. No other follow-up for BMI  required.       Electronically signed by:

## 2022-08-18 DIAGNOSIS — R00.2 PALPITATIONS: ICD-10-CM

## 2022-08-18 DIAGNOSIS — R07.9 CHEST PAIN, UNSPECIFIED TYPE: ICD-10-CM

## 2022-08-18 PROBLEM — R55 SYNCOPE AND COLLAPSE: Status: ACTIVE | Noted: 2022-08-18

## 2022-08-18 RX ORDER — ASPIRIN 81 MG/1
81 TABLET ORAL DAILY
Qty: 30 TABLET | Refills: 5 | Status: SHIPPED | OUTPATIENT
Start: 2022-08-18

## 2022-08-18 RX ORDER — METOPROLOL SUCCINATE 25 MG/1
25 TABLET, EXTENDED RELEASE ORAL DAILY
Qty: 30 TABLET | Refills: 11 | Status: SHIPPED | OUTPATIENT
Start: 2022-08-18

## 2022-08-18 RX ORDER — LISINOPRIL 10 MG/1
10 TABLET ORAL 2 TIMES DAILY
Qty: 30 TABLET | Refills: 11 | Status: SHIPPED | OUTPATIENT
Start: 2022-08-18 | End: 2023-02-13

## 2022-08-18 RX ORDER — CLOPIDOGREL BISULFATE 75 MG/1
75 TABLET ORAL DAILY
Qty: 30 TABLET | Refills: 11 | Status: SHIPPED | OUTPATIENT
Start: 2022-08-18

## 2022-08-18 RX ORDER — NITROGLYCERIN 0.4 MG/1
0.4 TABLET SUBLINGUAL
Qty: 25 TABLET | Refills: 3 | Status: SHIPPED | OUTPATIENT
Start: 2022-08-18

## 2022-08-30 ENCOUNTER — TELEPHONE (OUTPATIENT)
Dept: CARDIOLOGY | Facility: CLINIC | Age: 87
End: 2022-08-30

## 2022-08-30 NOTE — TELEPHONE ENCOUNTER
Nima Finney, [facility] nurse called to report BP at 108/70 HR 58. Heart rate has been staying between 56-80. BP normally 120-140/70-80. She wanted to make us aware. Advised to monitor BP/HR after meds and I will route message to Toño Valentine.

## 2023-02-11 DIAGNOSIS — I10 ESSENTIAL HYPERTENSION: Primary | ICD-10-CM

## 2023-02-13 RX ORDER — LISINOPRIL 10 MG/1
TABLET ORAL
Qty: 30 TABLET | Refills: 11 | Status: SHIPPED | OUTPATIENT
Start: 2023-02-13

## 2023-02-13 NOTE — TELEPHONE ENCOUNTER
Name from pharmacy: lisinopril 10 mg tablet          Will file in chart as: lisinopril (PRINIVIL,ZESTRIL) 10 MG tablet    Sig: TAKE ONE TABLET BY MOUTH TWICE DAILY    Disp:  30 tablet    Refills:  11    Start: 2/11/2023    Class: Normal    Non-formulary    To pharmacy: This prescription was filled on 2/11/2023. Any refills authorized will be placed on file.    Last ordered: 5 months ago by HENOK Ivey Last refill: 2/11/2023    Rx #: [id number]    ACE Inhibitors Protocol Failed 02/11/2023 10:21 AM   Protocol Details  Normal serum potassium in past 12 months    Blood pressure on record    GFR greater than 30 mL/min in past 12 months    No active pregnancy on record    No positive pregnancy test in past 12 months    Patient is not on Entresto    Patient is not on an ARB    Recent or future visit with authorizing provider

## 2023-02-20 ENCOUNTER — OFFICE VISIT (OUTPATIENT)
Dept: CARDIOLOGY | Facility: CLINIC | Age: 88
End: 2023-02-20
Payer: MEDICARE

## 2023-02-20 VITALS
BODY MASS INDEX: 22.84 KG/M2 | HEART RATE: 72 BPM | SYSTOLIC BLOOD PRESSURE: 138 MMHG | DIASTOLIC BLOOD PRESSURE: 76 MMHG | WEIGHT: 133.8 LBS | OXYGEN SATURATION: 96 % | HEIGHT: 64 IN

## 2023-02-20 DIAGNOSIS — I10 ESSENTIAL HYPERTENSION: ICD-10-CM

## 2023-02-20 DIAGNOSIS — I47.1 PAROXYSMAL SVT (SUPRAVENTRICULAR TACHYCARDIA): Primary | ICD-10-CM

## 2023-02-20 DIAGNOSIS — R06.02 SHORTNESS OF BREATH: ICD-10-CM

## 2023-02-20 PROCEDURE — 99214 OFFICE O/P EST MOD 30 MIN: CPT | Performed by: PHYSICIAN ASSISTANT

## 2023-02-20 NOTE — PROGRESS NOTES
Problem list     Subjective   Radha Jung is a 87 y.o. female     Chief Complaint   Patient presents with   • Hypertension     6 month f/u   • Palpitations   • Shortness of Breath   Problem list  1.  CVA  1.1 left-sided CVA diagnosed November 2020.  1.1 carotid duplex showing nonobstructive disease bilaterally November 2020  1.2 echocardiogram not demonstrating any evidence of septal defect or evidence of mural thrombus  2.  Preserved systolic function  3.  Stress test 2018 showing no evidence of ischemia preserved LV function  4.  Fatigue  5.  Palpitations  5.1 Holter monitor 2016 demonstrated PVCs, PACs and short burst of SVT  6.  Stress test March 2021 suggest inferolateral ischemia preserved LV function      HPI    Patient is an 87-year-old female who presents back to the office for follow-up.  As above, she has history of CVA.  We have followed this patient routinely.  We tried to get her to consider event monitoring especially in the setting that she had SVT in the past, she has refused.  She also had a stress test showing possible ischemia but she has refused further evaluation.    She has done well without chest pain but is short of breath.  She has apparently seen pulmonology recently and had a CT examination performed.  She does not describe PND orthopnea.    She does not describe palpitating nor does she complain of dysrhythmic symptoms no symptoms of cerebral embolic events.  Otherwise patient is stable.    Current Outpatient Medications on File Prior to Visit   Medication Sig Dispense Refill   • aspirin (aspirin) 81 MG EC tablet Take 1 tablet by mouth Daily. 30 tablet 5   • atorvastatin (LIPITOR) 40 MG tablet TAKE ONE TABLET BY MOUTH EVERY DAY 30 tablet 11   • clopidogrel (PLAVIX) 75 MG tablet Take 1 tablet by mouth Daily. 30 tablet 11   • furosemide (LASIX) 20 MG tablet Take 1 tablet by mouth Daily As Needed (For 2 lb weight gain overnight and/or swelling in legs). 30 tablet 11   • lisinopril  (PRINIVIL,ZESTRIL) 10 MG tablet TAKE ONE TABLET BY MOUTH TWICE DAILY 30 tablet 11   • metoprolol succinate XL (TOPROL-XL) 25 MG 24 hr tablet Take 1 tablet by mouth Daily. 30 tablet 11   • nitroglycerin (NITROSTAT) 0.4 MG SL tablet Place 1 tablet under the tongue Every 5 (Five) Minutes As Needed for Chest Pain. 25 tablet 3     No current facility-administered medications on file prior to visit.       Patient has no known allergies.    Past Medical History:   Diagnosis Date   • Asthma    • Breast cancer (HCC)    • Colon cancer (HCC)    • Hyperlipidemia    • TIA (transient ischemic attack)    • TIA (transient ischemic attack)        Social History     Socioeconomic History   • Marital status:    Tobacco Use   • Smoking status: Former     Types: Cigarettes   • Smokeless tobacco: Never   • Tobacco comments:     -EL 2/12/19    Substance and Sexual Activity   • Alcohol use: Yes     Comment: Social   • Drug use: No   • Sexual activity: Defer       Family History   Problem Relation Age of Onset   • No Known Problems Mother    • Asthma Father        Review of Systems   Constitutional: Positive for fatigue. Negative for chills, diaphoresis and fever.   HENT:        Sinus issues   Eyes: Negative.    Respiratory: Positive for cough and shortness of breath (with any activity and at rest). Negative for apnea, chest tightness and wheezing.    Cardiovascular: Positive for palpitations. Negative for chest pain and leg swelling.   Gastrointestinal: Negative.  Negative for blood in stool.   Endocrine: Negative.    Genitourinary: Negative.  Negative for hematuria.   Musculoskeletal: Positive for arthralgias, back pain, myalgias and neck pain.   Skin: Negative.    Allergic/Immunologic: Negative.    Neurological: Negative.  Negative for dizziness, syncope (none since last visit), weakness, light-headedness, numbness and headaches.   Hematological: Negative.  Does not bruise/bleed easily.   Psychiatric/Behavioral: Negative.   "Negative for sleep disturbance.       Objective   Vitals:    02/20/23 1447   BP: 138/76   BP Location: Left arm   Patient Position: Sitting   Pulse: 72   SpO2: 96%   Weight: 60.7 kg (133 lb 12.8 oz)   Height: 162.6 cm (64.02\")      /76 (BP Location: Left arm, Patient Position: Sitting)   Pulse 72   Ht 162.6 cm (64.02\")   Wt 60.7 kg (133 lb 12.8 oz)   SpO2 96%   BMI 22.96 kg/m²     Lab Results (most recent)     None          Physical Exam  Vitals and nursing note reviewed.   Constitutional:       General: She is not in acute distress.     Appearance: Normal appearance. She is well-developed.   HENT:      Head: Normocephalic and atraumatic.   Eyes:      General: No scleral icterus.        Right eye: No discharge.         Left eye: No discharge.      Conjunctiva/sclera: Conjunctivae normal.   Neck:      Vascular: No carotid bruit.   Cardiovascular:      Rate and Rhythm: Normal rate and regular rhythm.      Heart sounds: Normal heart sounds. No murmur heard.    No friction rub. No gallop.   Pulmonary:      Effort: Pulmonary effort is normal. No respiratory distress.      Breath sounds: Normal breath sounds. No wheezing or rales.   Chest:      Chest wall: No tenderness.   Musculoskeletal:      Right lower leg: No edema.      Left lower leg: No edema.   Skin:     General: Skin is warm and dry.      Coloration: Skin is not pale.      Findings: No erythema or rash.   Neurological:      Mental Status: She is alert and oriented to person, place, and time.      Cranial Nerves: No cranial nerve deficit.   Psychiatric:         Behavior: Behavior normal.         Procedure   Procedures       Assessment & Plan     Problems Addressed this Visit        Cardiac and Vasculature    Essential hypertension    Paroxysmal SVT (supraventricular tachycardia) (HCC) - Primary       Pulmonary and Pneumonias    Shortness of breath   Diagnoses       Codes Comments    Paroxysmal SVT (supraventricular tachycardia) (HCC)    -  Primary " "ICD-10-CM: I47.1  ICD-9-CM: 427.0     Shortness of breath     ICD-10-CM: R06.02  ICD-9-CM: 786.05     Essential hypertension     ICD-10-CM: I10  ICD-9-CM: 401.9           Recommendation  1.  Patient with history of arrhythmias as well as SVT but appears to be doing well from that standpoint.  We will continue medical therapy.  If she were to have breakthrough symptoms, we can make adjustments at this time.  Again, I would recommend her wearing an event monitor especially in the setting that she has had a CVA in the past and with history of SVT, I would be highly concerned about underlying A-fib but she refuses.    2.  In fact, she complains of significant exertional dyspnea and she had a stress test showing inferolateral ischemia and we discussed that obstructive coronary disease could be the cause of her dyspnea.  Her recent CT scan apparently demonstrated atelectasis and a mild amount of fibrosis.  However, despite multiple attempts at counseling this patient for further evaluation, she responds \"I do not see how it would help\".  Even after trying to discuss with her on how it could, she does not seem agreeable to any further evaluation from a cardiac standpoint despite the risk of MI or even stroke.  Therefore, we will abide by her wishes and not pursue anything further unless her mild changes.    3.  Patient with baseline hypertension doing well on medical therapy and I will make no change.  We will see her back for follow-up in 6 months or sooner as symptoms discussed.  Follow-up with primary as scheduled.           Radha Jung  reports that she has quit smoking. Her smoking use included cigarettes. She has never used smokeless tobacco..    Advance Care Planning   ACP discussion was held with the patient during this visit. Patient has an advance directive in EMR which is still valid.      Electronically signed by:    "

## 2023-02-21 PROBLEM — I47.1 PAROXYSMAL SVT (SUPRAVENTRICULAR TACHYCARDIA) (HCC): Status: ACTIVE | Noted: 2023-02-21

## 2023-08-18 DIAGNOSIS — I10 ESSENTIAL HYPERTENSION: ICD-10-CM

## 2023-08-18 RX ORDER — LISINOPRIL 10 MG/1
TABLET ORAL
Qty: 30 TABLET | Refills: 11 | Status: SHIPPED | OUTPATIENT
Start: 2023-08-18